# Patient Record
Sex: MALE | Race: WHITE | NOT HISPANIC OR LATINO | Employment: PART TIME | URBAN - METROPOLITAN AREA
[De-identification: names, ages, dates, MRNs, and addresses within clinical notes are randomized per-mention and may not be internally consistent; named-entity substitution may affect disease eponyms.]

---

## 2018-05-17 ENCOUNTER — OFFICE VISIT (OUTPATIENT)
Dept: CARDIOLOGY | Facility: CLINIC | Age: 63
End: 2018-05-17

## 2018-05-17 VITALS
SYSTOLIC BLOOD PRESSURE: 122 MMHG | WEIGHT: 260.8 LBS | HEART RATE: 96 BPM | HEIGHT: 70 IN | BODY MASS INDEX: 37.34 KG/M2 | DIASTOLIC BLOOD PRESSURE: 82 MMHG

## 2018-05-17 DIAGNOSIS — Z79.4 TYPE 2 DIABETES MELLITUS WITHOUT COMPLICATION, WITH LONG-TERM CURRENT USE OF INSULIN (HCC): ICD-10-CM

## 2018-05-17 DIAGNOSIS — R94.39 ABNORMAL STRESS ECHO: ICD-10-CM

## 2018-05-17 DIAGNOSIS — IMO0001 CLASS 2 OBESITY DUE TO EXCESS CALORIES WITH SERIOUS COMORBIDITY AND BODY MASS INDEX (BMI) OF 37.0 TO 37.9 IN ADULT: ICD-10-CM

## 2018-05-17 DIAGNOSIS — G47.33 OSA (OBSTRUCTIVE SLEEP APNEA): ICD-10-CM

## 2018-05-17 DIAGNOSIS — E78.5 HYPERLIPIDEMIA, UNSPECIFIED HYPERLIPIDEMIA TYPE: ICD-10-CM

## 2018-05-17 DIAGNOSIS — E11.9 TYPE 2 DIABETES MELLITUS WITHOUT COMPLICATION, WITH LONG-TERM CURRENT USE OF INSULIN (HCC): ICD-10-CM

## 2018-05-17 DIAGNOSIS — Z01.810 PREOP CARDIOVASCULAR EXAM: Primary | ICD-10-CM

## 2018-05-17 DIAGNOSIS — I10 ESSENTIAL HYPERTENSION: ICD-10-CM

## 2018-05-17 PROCEDURE — 99204 OFFICE O/P NEW MOD 45 MIN: CPT | Performed by: INTERNAL MEDICINE

## 2018-05-17 PROCEDURE — 93000 ELECTROCARDIOGRAM COMPLETE: CPT | Performed by: INTERNAL MEDICINE

## 2018-05-17 NOTE — PROGRESS NOTES
Date of Office Visit: 2018  Encounter Provider: Suzi Ambriz MD  Place of Service: [facility] CARDIOLOGY  Patient Name: Mark Manzo  :1955      Patient ID:  Mark Manzo is a 62 y.o. male is here for preoperative evaluation.           History of Present Illness    Mr. Manzo is preparing to have a right total knee replacement at [facility].  He has a known history of diabetes mellitus type 2, hyperlipidemia, obesity and hypertension.  His last hemoglobin A1c was 2017, 5.7.  He is on insulin and metformin.  He has observed sleep apnea and uses a CPAP machine.  He has osteoarthritis and needs a knee replacement.    He has history of alcohol dependence which is in remission, joint pain, fatigue.  He has a prior history of a left hip replacement.    He is  and lives alone and has 2 children.  He is a .  He has not had alcohol in 24 years.  He smoking 24 years ago.  He has 3 cups of coffee per day and 2 glasses of iced tea.    In his family, his father heart disease and strokes.  His mother and father both had hypertension.    He had a stress echocardiogram on 18 which was abnormal showing mild LVH, grade 1a diastolic dysfunction, LVEF at rest of 60% and abnormal stress study showing apical hypokinesis with exercise, consistent with ischemia.  He had no cp but did have dyspnea with exertion.     He is fairly sedentary.  He had lost 60 pounds but has gained it back because he's not been able to exercise due to knee pain.  He's been on insulin for greater than 10 years.  He doesn't feel his heart racing or skipping.  He's had no dizziness or syncope.  She denies chest pain or pressure but does have exertional dyspnea.  Has a history of prior myocardial infarction or heart failure.  There is no history of cancer, blood clots, renal disease, asthma, emphysema, strokes or aneurysms.    His surgery is planned with Dr. Mark Duran.        Past Medical  History:   Diagnosis Date   • Acute pain of right knee     H/O   • Body aches    • Chronic left hip pain    • Decreased energy    • Diabetes mellitus    • Fatigue    • History of allergic rhinitis    • History of left hip replacement    • Hyperlipidemia    • Hypertension    • Joint pain    • Sleep apnea    • Swelling    • Weight gain          Past Surgical History:   Procedure Laterality Date   • COLONOSCOPY     • OTHER SURGICAL HISTORY      Rectal Surgery Of Perirectal Fistula   • TONSILLECTOMY     • TOTAL HIP ARTHROPLASTY Left    • VASECTOMY         Current Outpatient Prescriptions on File Prior to Visit   Medication Sig Dispense Refill   • aspirin 81 MG chewable tablet Chew 81 mg Daily.     • cholestyramine (QUESTRAN) 4 g packet Take 1 packet by mouth Every 12 (Twelve) Hours.     • diclofenac sodium (VOTAREN XR) 100 MG 24 hr tablet Take 100 mg by mouth Daily.     • insulin NPH (humuLIN N,novoLIN N) 100 UNIT/ML injection Inject  under the skin 2 (Two) Times a Day Before Meals.     • lisinopril (PRINIVIL,ZESTRIL) 20 MG tablet Take 20 mg by mouth Daily.     • meloxicam (MOBIC) 7.5 MG tablet Take 7.5 mg by mouth Daily.     • metFORMIN (GLUCOPHAGE) 500 MG tablet Take 500 mg by mouth 2 (Two) Times a Day With Meals.       No current facility-administered medications on file prior to visit.        Social History     Social History   • Marital status:      Spouse name: N/A   • Number of children: N/A   • Years of education: N/A     Occupational History   • Not on file.     Social History Main Topics   • Smoking status: Former Smoker   • Smokeless tobacco: Former User   • Alcohol use Not on file      Comment: sober for 24 years    • Drug use: No      Comment: clean for 24 years    • Sexual activity: Not on file     Other Topics Concern   • Not on file     Social History Narrative   • No narrative on file           Review of Systems   Constitution: Negative.   HENT: Positive for hearing loss. Negative for  "congestion.    Eyes: Negative for vision loss in left eye and vision loss in right eye.   Cardiovascular: Positive for leg swelling.   Respiratory: Positive for snoring. Negative for cough, hemoptysis, shortness of breath, sleep disturbances due to breathing, sputum production and wheezing.    Endocrine: Negative.    Hematologic/Lymphatic: Negative.    Skin: Negative for poor wound healing and rash.   Musculoskeletal: Positive for joint pain. Negative for falls, gout, muscle cramps and myalgias.   Gastrointestinal: Negative for abdominal pain, diarrhea, dysphagia, hematemesis, melena, nausea and vomiting.   Neurological: Negative for excessive daytime sleepiness, dizziness, headaches, light-headedness, loss of balance, seizures and vertigo.   Psychiatric/Behavioral: Negative for depression and substance abuse. The patient is not nervous/anxious.        Procedures    ECG 12 Lead  Date/Time: 5/17/2018 8:26 AM  Performed by: OREN FIELDS  Authorized by: OREN FIELDS   Comparison: not compared with previous ECG   Previous ECG: no previous ECG available  Rhythm: sinus rhythm  Clinical impression: normal ECG                Objective:      Vitals:    05/17/18 0816 05/17/18 0821   BP: 130/82 122/82   BP Location: Right arm Left arm   Patient Position: Sitting Sitting   Pulse: 96    Weight: 118 kg (260 lb 12.8 oz)    Height: 177.8 cm (70\")      Body mass index is 37.42 kg/m².    Physical Exam   Constitutional: He is oriented to person, place, and time. He appears well-developed and well-nourished. No distress.   HENT:   Head: Normocephalic and atraumatic.   Eyes: Conjunctivae are normal. No scleral icterus.   Neck: Neck supple. No JVD present. Carotid bruit is not present. No thyromegaly present.   Cardiovascular: Normal rate, regular rhythm, S1 normal, S2 normal, normal heart sounds and intact distal pulses.   No extrasystoles are present. PMI is not displaced.  Exam reveals no gallop.    No murmur " heard.  Pulses:       Carotid pulses are 2+ on the right side, and 2+ on the left side.       Radial pulses are 2+ on the right side, and 2+ on the left side.        Dorsalis pedis pulses are 2+ on the right side, and 2+ on the left side.        Posterior tibial pulses are 2+ on the right side, and 2+ on the left side.   Pulmonary/Chest: Effort normal and breath sounds normal. No respiratory distress. He has no wheezes. He has no rhonchi. He has no rales. He exhibits no tenderness.   Abdominal: Soft. Bowel sounds are normal. He exhibits no distension, no abdominal bruit and no mass. There is no tenderness.   Musculoskeletal: He exhibits no edema or deformity.   Lymphadenopathy:     He has no cervical adenopathy.   Neurological: He is alert and oriented to person, place, and time. No cranial nerve deficit.   Skin: Skin is warm and dry. No rash noted. He is not diaphoretic. No cyanosis. No pallor. Nails show no clubbing.   Psychiatric: He has a normal mood and affect. Judgment normal.   Vitals reviewed.      Lab Review:       Assessment:      Diagnosis Plan   1. Preop cardiovascular exam  Case Request Cath Lab: Coronary angiography    Vascular Screening   2. Essential hypertension  Vascular Screening   3. Hyperlipidemia, unspecified hyperlipidemia type  Vascular Screening   4. Type 2 diabetes mellitus without complication, with long-term current use of insulin     5. VANDANA (obstructive sleep apnea)     6. Class 2 obesity due to excess calories with serious comorbidity and body mass index (BMI) of 37.0 to 37.9 in adult     7. Abnormal stress echo  Case Request Cath Lab: Coronary angiography     1. Preoperative evaluation for right knee surgery.  He is very sedentary.  The surgery has been delayed.  Dr. Duran's surgeon  2. hyperlipidemia.  He is on cholestyramine.  He's been intolerant with statin therapies in the past causing fatigue and muscle aching.  3. Diabetes mellitus type 2, on insulin for greater than 10 years.   Also on metformin.  4. Obesity and sedentary lifestyle  5. Hypertension, well controlled  6. Obstructive sleep apnea, on CPAP.     Plan:       Set up catheterization and vascular screening.  No medication changes.  Risks and benefits of the procedure d/w patient and he wants to proceed.     See Hanna in 1 year unless he requires coronary intervention.

## 2018-05-22 ENCOUNTER — TRANSCRIBE ORDERS (OUTPATIENT)
Dept: CARDIOLOGY | Facility: CLINIC | Age: 63
End: 2018-05-22

## 2018-05-22 ENCOUNTER — LAB (OUTPATIENT)
Dept: LAB | Facility: HOSPITAL | Age: 63
End: 2018-05-22
Attending: INTERNAL MEDICINE

## 2018-05-22 DIAGNOSIS — Z01.810 PRE-OPERATIVE CARDIOVASCULAR EXAMINATION: Primary | ICD-10-CM

## 2018-05-22 DIAGNOSIS — Z01.810 PRE-OPERATIVE CARDIOVASCULAR EXAMINATION: ICD-10-CM

## 2018-05-22 DIAGNOSIS — Z13.6 SCREENING FOR ISCHEMIC HEART DISEASE: ICD-10-CM

## 2018-05-22 LAB
ANION GAP SERPL CALCULATED.3IONS-SCNC: 11.8 MMOL/L
BASOPHILS # BLD AUTO: 0.02 10*3/MM3 (ref 0–0.2)
BASOPHILS NFR BLD AUTO: 0.4 % (ref 0–1.5)
BUN BLD-MCNC: 33 MG/DL (ref 8–23)
BUN/CREAT SERPL: 30.3 (ref 7–25)
CALCIUM SPEC-SCNC: 8.9 MG/DL (ref 8.6–10.5)
CHLORIDE SERPL-SCNC: 109 MMOL/L (ref 98–107)
CO2 SERPL-SCNC: 23.2 MMOL/L (ref 22–29)
CREAT BLD-MCNC: 1.09 MG/DL (ref 0.76–1.27)
DEPRECATED RDW RBC AUTO: 44.2 FL (ref 37–54)
EOSINOPHIL # BLD AUTO: 0.06 10*3/MM3 (ref 0–0.7)
EOSINOPHIL NFR BLD AUTO: 1.3 % (ref 0.3–6.2)
ERYTHROCYTE [DISTWIDTH] IN BLOOD BY AUTOMATED COUNT: 13.3 % (ref 11.5–14.5)
GFR SERPL CREATININE-BSD FRML MDRD: 68 ML/MIN/1.73
GLUCOSE BLD-MCNC: 220 MG/DL (ref 65–99)
HCT VFR BLD AUTO: 41.1 % (ref 40.4–52.2)
HGB BLD-MCNC: 13.4 G/DL (ref 13.7–17.6)
IMM GRANULOCYTES # BLD: 0.01 10*3/MM3 (ref 0–0.03)
IMM GRANULOCYTES NFR BLD: 0.2 % (ref 0–0.5)
LYMPHOCYTES # BLD AUTO: 1.41 10*3/MM3 (ref 0.9–4.8)
LYMPHOCYTES NFR BLD AUTO: 31.5 % (ref 19.6–45.3)
MCH RBC QN AUTO: 29.8 PG (ref 27–32.7)
MCHC RBC AUTO-ENTMCNC: 32.6 G/DL (ref 32.6–36.4)
MCV RBC AUTO: 91.3 FL (ref 79.8–96.2)
MONOCYTES # BLD AUTO: 0.36 10*3/MM3 (ref 0.2–1.2)
MONOCYTES NFR BLD AUTO: 8.1 % (ref 5–12)
NEUTROPHILS # BLD AUTO: 2.62 10*3/MM3 (ref 1.9–8.1)
NEUTROPHILS NFR BLD AUTO: 58.7 % (ref 42.7–76)
PLATELET # BLD AUTO: 230 10*3/MM3 (ref 140–500)
PMV BLD AUTO: 10.4 FL (ref 6–12)
POTASSIUM BLD-SCNC: 4.8 MMOL/L (ref 3.5–5.2)
RBC # BLD AUTO: 4.5 10*6/MM3 (ref 4.6–6)
SODIUM BLD-SCNC: 144 MMOL/L (ref 136–145)
WBC NRBC COR # BLD: 4.47 10*3/MM3 (ref 4.5–10.7)

## 2018-05-22 PROCEDURE — 85025 COMPLETE CBC W/AUTO DIFF WBC: CPT

## 2018-05-22 PROCEDURE — 36415 COLL VENOUS BLD VENIPUNCTURE: CPT

## 2018-05-22 PROCEDURE — 80048 BASIC METABOLIC PNL TOTAL CA: CPT

## 2018-05-23 ENCOUNTER — HOSPITAL ENCOUNTER (OUTPATIENT)
Facility: HOSPITAL | Age: 63
Setting detail: HOSPITAL OUTPATIENT SURGERY
Discharge: HOME | End: 2018-05-23
Attending: INTERNAL MEDICINE | Admitting: INTERNAL MEDICINE

## 2018-05-23 VITALS
HEIGHT: 70 IN | BODY MASS INDEX: 35.79 KG/M2 | SYSTOLIC BLOOD PRESSURE: 153 MMHG | TEMPERATURE: 97.5 F | OXYGEN SATURATION: 97 % | DIASTOLIC BLOOD PRESSURE: 83 MMHG | WEIGHT: 250 LBS | RESPIRATION RATE: 16 BRPM | HEART RATE: 74 BPM

## 2018-05-23 DIAGNOSIS — R94.39 ABNORMAL STRESS ECHO: ICD-10-CM

## 2018-05-23 DIAGNOSIS — Z01.810 PREOP CARDIOVASCULAR EXAM: ICD-10-CM

## 2018-05-23 LAB
GLUCOSE BLDC GLUCOMTR-MCNC: 123 MG/DL (ref 70–130)
GLUCOSE BLDC GLUCOMTR-MCNC: 124 MG/DL (ref 70–130)

## 2018-05-23 PROCEDURE — 99152 MOD SED SAME PHYS/QHP 5/>YRS: CPT | Performed by: INTERNAL MEDICINE

## 2018-05-23 PROCEDURE — C1894 INTRO/SHEATH, NON-LASER: HCPCS | Performed by: INTERNAL MEDICINE

## 2018-05-23 PROCEDURE — C1769 GUIDE WIRE: HCPCS | Performed by: INTERNAL MEDICINE

## 2018-05-23 PROCEDURE — 25010000002 FENTANYL CITRATE (PF) 100 MCG/2ML SOLUTION: Performed by: INTERNAL MEDICINE

## 2018-05-23 PROCEDURE — 93458 L HRT ARTERY/VENTRICLE ANGIO: CPT | Performed by: INTERNAL MEDICINE

## 2018-05-23 PROCEDURE — 99220 PR INITIAL OBSERVATION CARE/DAY 70 MINUTES: CPT | Performed by: NURSE PRACTITIONER

## 2018-05-23 PROCEDURE — 25010000002 MIDAZOLAM PER 1 MG: Performed by: INTERNAL MEDICINE

## 2018-05-23 PROCEDURE — 82962 GLUCOSE BLOOD TEST: CPT

## 2018-05-23 PROCEDURE — 0 IOPAMIDOL PER 1 ML: Performed by: INTERNAL MEDICINE

## 2018-05-23 RX ORDER — SODIUM CHLORIDE 9 MG/ML
75 INJECTION, SOLUTION INTRAVENOUS CONTINUOUS
Status: DISCONTINUED | OUTPATIENT
Start: 2018-05-23 | End: 2018-05-23 | Stop reason: HOSPADM

## 2018-05-23 RX ORDER — SODIUM CHLORIDE 0.9 % (FLUSH) 0.9 %
1-10 SYRINGE (ML) INJECTION AS NEEDED
Status: DISCONTINUED | OUTPATIENT
Start: 2018-05-23 | End: 2018-05-23 | Stop reason: HOSPADM

## 2018-05-23 RX ORDER — FENTANYL CITRATE 50 UG/ML
INJECTION, SOLUTION INTRAMUSCULAR; INTRAVENOUS AS NEEDED
Status: DISCONTINUED | OUTPATIENT
Start: 2018-05-23 | End: 2018-05-23 | Stop reason: HOSPADM

## 2018-05-23 RX ORDER — SODIUM CHLORIDE 9 MG/ML
100 INJECTION, SOLUTION INTRAVENOUS CONTINUOUS
Status: DISCONTINUED | OUTPATIENT
Start: 2018-05-23 | End: 2018-05-23 | Stop reason: HOSPADM

## 2018-05-23 RX ORDER — LIDOCAINE HYDROCHLORIDE 20 MG/ML
INJECTION, SOLUTION INFILTRATION; PERINEURAL AS NEEDED
Status: DISCONTINUED | OUTPATIENT
Start: 2018-05-23 | End: 2018-05-23 | Stop reason: HOSPADM

## 2018-05-23 RX ORDER — MIDAZOLAM HYDROCHLORIDE 1 MG/ML
INJECTION INTRAMUSCULAR; INTRAVENOUS AS NEEDED
Status: DISCONTINUED | OUTPATIENT
Start: 2018-05-23 | End: 2018-05-23 | Stop reason: HOSPADM

## 2018-05-23 RX ORDER — LIDOCAINE HYDROCHLORIDE 10 MG/ML
0.1 INJECTION, SOLUTION EPIDURAL; INFILTRATION; INTRACAUDAL; PERINEURAL ONCE AS NEEDED
Status: DISCONTINUED | OUTPATIENT
Start: 2018-05-23 | End: 2018-05-23 | Stop reason: HOSPADM

## 2018-05-23 RX ADMIN — SODIUM CHLORIDE 75 ML/HR: 9 INJECTION, SOLUTION INTRAVENOUS at 08:58

## 2018-05-23 RX ADMIN — SODIUM CHLORIDE 75 ML/HR: 9 INJECTION, SOLUTION INTRAVENOUS at 09:51

## 2018-05-23 NOTE — H&P (VIEW-ONLY)
Date of Office Visit: 2018  Encounter Provider: Suzi Ambriz MD  Place of Service: [facility] CARDIOLOGY  Patient Name: Mark Manzo  :1955      Patient ID:  Mark Manzo is a 62 y.o. male is here for preoperative evaluation.           History of Present Illness    Mr. Manzo is preparing to have a right total knee replacement at [facility].  He has a known history of diabetes mellitus type 2, hyperlipidemia, obesity and hypertension.  His last hemoglobin A1c was 2017, 5.7.  He is on insulin and metformin.  He has observed sleep apnea and uses a CPAP machine.  He has osteoarthritis and needs a knee replacement.    He has history of alcohol dependence which is in remission, joint pain, fatigue.  He has a prior history of a left hip replacement.    He is  and lives alone and has 2 children.  He is a .  He has not had alcohol in 24 years.  He smoking 24 years ago.  He has 3 cups of coffee per day and 2 glasses of iced tea.    In his family, his father heart disease and strokes.  His mother and father both had hypertension.    He had a stress echocardiogram on 18 which was abnormal showing mild LVH, grade 1a diastolic dysfunction, LVEF at rest of 60% and abnormal stress study showing apical hypokinesis with exercise, consistent with ischemia.  He had no cp but did have dyspnea with exertion.     He is fairly sedentary.  He had lost 60 pounds but has gained it back because he's not been able to exercise due to knee pain.  He's been on insulin for greater than 10 years.  He doesn't feel his heart racing or skipping.  He's had no dizziness or syncope.  She denies chest pain or pressure but does have exertional dyspnea.  Has a history of prior myocardial infarction or heart failure.  There is no history of cancer, blood clots, renal disease, asthma, emphysema, strokes or aneurysms.    His surgery is planned with Dr. Mark Duran.        Past Medical  History:   Diagnosis Date   • Acute pain of right knee     H/O   • Body aches    • Chronic left hip pain    • Decreased energy    • Diabetes mellitus    • Fatigue    • History of allergic rhinitis    • History of left hip replacement    • Hyperlipidemia    • Hypertension    • Joint pain    • Sleep apnea    • Swelling    • Weight gain          Past Surgical History:   Procedure Laterality Date   • COLONOSCOPY     • OTHER SURGICAL HISTORY      Rectal Surgery Of Perirectal Fistula   • TONSILLECTOMY     • TOTAL HIP ARTHROPLASTY Left    • VASECTOMY         Current Outpatient Prescriptions on File Prior to Visit   Medication Sig Dispense Refill   • aspirin 81 MG chewable tablet Chew 81 mg Daily.     • cholestyramine (QUESTRAN) 4 g packet Take 1 packet by mouth Every 12 (Twelve) Hours.     • diclofenac sodium (VOTAREN XR) 100 MG 24 hr tablet Take 100 mg by mouth Daily.     • insulin NPH (humuLIN N,novoLIN N) 100 UNIT/ML injection Inject  under the skin 2 (Two) Times a Day Before Meals.     • lisinopril (PRINIVIL,ZESTRIL) 20 MG tablet Take 20 mg by mouth Daily.     • meloxicam (MOBIC) 7.5 MG tablet Take 7.5 mg by mouth Daily.     • metFORMIN (GLUCOPHAGE) 500 MG tablet Take 500 mg by mouth 2 (Two) Times a Day With Meals.       No current facility-administered medications on file prior to visit.        Social History     Social History   • Marital status:      Spouse name: N/A   • Number of children: N/A   • Years of education: N/A     Occupational History   • Not on file.     Social History Main Topics   • Smoking status: Former Smoker   • Smokeless tobacco: Former User   • Alcohol use Not on file      Comment: sober for 24 years    • Drug use: No      Comment: clean for 24 years    • Sexual activity: Not on file     Other Topics Concern   • Not on file     Social History Narrative   • No narrative on file           Review of Systems   Constitution: Negative.   HENT: Positive for hearing loss. Negative for  "congestion.    Eyes: Negative for vision loss in left eye and vision loss in right eye.   Cardiovascular: Positive for leg swelling.   Respiratory: Positive for snoring. Negative for cough, hemoptysis, shortness of breath, sleep disturbances due to breathing, sputum production and wheezing.    Endocrine: Negative.    Hematologic/Lymphatic: Negative.    Skin: Negative for poor wound healing and rash.   Musculoskeletal: Positive for joint pain. Negative for falls, gout, muscle cramps and myalgias.   Gastrointestinal: Negative for abdominal pain, diarrhea, dysphagia, hematemesis, melena, nausea and vomiting.   Neurological: Negative for excessive daytime sleepiness, dizziness, headaches, light-headedness, loss of balance, seizures and vertigo.   Psychiatric/Behavioral: Negative for depression and substance abuse. The patient is not nervous/anxious.        Procedures    ECG 12 Lead  Date/Time: 5/17/2018 8:26 AM  Performed by: OREN FIELDS  Authorized by: OREN FIELDS   Comparison: not compared with previous ECG   Previous ECG: no previous ECG available  Rhythm: sinus rhythm  Clinical impression: normal ECG                Objective:      Vitals:    05/17/18 0816 05/17/18 0821   BP: 130/82 122/82   BP Location: Right arm Left arm   Patient Position: Sitting Sitting   Pulse: 96    Weight: 118 kg (260 lb 12.8 oz)    Height: 177.8 cm (70\")      Body mass index is 37.42 kg/m².    Physical Exam   Constitutional: He is oriented to person, place, and time. He appears well-developed and well-nourished. No distress.   HENT:   Head: Normocephalic and atraumatic.   Eyes: Conjunctivae are normal. No scleral icterus.   Neck: Neck supple. No JVD present. Carotid bruit is not present. No thyromegaly present.   Cardiovascular: Normal rate, regular rhythm, S1 normal, S2 normal, normal heart sounds and intact distal pulses.   No extrasystoles are present. PMI is not displaced.  Exam reveals no gallop.    No murmur " heard.  Pulses:       Carotid pulses are 2+ on the right side, and 2+ on the left side.       Radial pulses are 2+ on the right side, and 2+ on the left side.        Dorsalis pedis pulses are 2+ on the right side, and 2+ on the left side.        Posterior tibial pulses are 2+ on the right side, and 2+ on the left side.   Pulmonary/Chest: Effort normal and breath sounds normal. No respiratory distress. He has no wheezes. He has no rhonchi. He has no rales. He exhibits no tenderness.   Abdominal: Soft. Bowel sounds are normal. He exhibits no distension, no abdominal bruit and no mass. There is no tenderness.   Musculoskeletal: He exhibits no edema or deformity.   Lymphadenopathy:     He has no cervical adenopathy.   Neurological: He is alert and oriented to person, place, and time. No cranial nerve deficit.   Skin: Skin is warm and dry. No rash noted. He is not diaphoretic. No cyanosis. No pallor. Nails show no clubbing.   Psychiatric: He has a normal mood and affect. Judgment normal.   Vitals reviewed.      Lab Review:       Assessment:      Diagnosis Plan   1. Preop cardiovascular exam  Case Request Cath Lab: Coronary angiography    Vascular Screening   2. Essential hypertension  Vascular Screening   3. Hyperlipidemia, unspecified hyperlipidemia type  Vascular Screening   4. Type 2 diabetes mellitus without complication, with long-term current use of insulin     5. VANDANA (obstructive sleep apnea)     6. Class 2 obesity due to excess calories with serious comorbidity and body mass index (BMI) of 37.0 to 37.9 in adult     7. Abnormal stress echo  Case Request Cath Lab: Coronary angiography     1. Preoperative evaluation for right knee surgery.  He is very sedentary.  The surgery has been delayed.  Dr. Duran's surgeon  2. hyperlipidemia.  He is on cholestyramine.  He's been intolerant with statin therapies in the past causing fatigue and muscle aching.  3. Diabetes mellitus type 2, on insulin for greater than 10 years.   Also on metformin.  4. Obesity and sedentary lifestyle  5. Hypertension, well controlled  6. Obstructive sleep apnea, on CPAP.     Plan:       Set up catheterization and vascular screening.  No medication changes.  Risks and benefits of the procedure d/w patient and he wants to proceed.     See Hanna in 1 year unless he requires coronary intervention.

## 2018-05-23 NOTE — PERIOPERATIVE NURSING NOTE
Call to KATHIA Cruz with Dr. Ortega yo make sure they were aware of consult. She said that they are aware and Dr. Ortega wants to see the patient before he goes home today.

## 2018-05-23 NOTE — INTERVAL H&P NOTE
Multiple CRF with knee surg scheduled and abnl strews.  H&P reviewed. The patient was examined and there are no changes to the H&P. I have explained the risks and benefits of the procedure to the patient.  The patient understands and agrees to proceed

## 2018-05-23 NOTE — CONSULTS
Patient Care Team:  Malia Meza MD as PCP - General (Family Medicine)    Chief complaint:  CAD    Subjective     History of Present Illness:  Mr. Manzo is a 62-year-old male who we were asked to see by Dr. Sarmiento status post cardiac catheter.  The patient was seen in Dr. Ambriz's office after an abnormal EKG was obtained in preop evaluation for a right total knee replacement.  The patient had an abnormal stress test that ultimately led to a cardiac catheter today.  The patient states that he has had no symptomology, he denies chest pain, shortness of breath, fatigue, orthopnea, edema, or syncope.  His cardiac catheter demonstrated significant multivessel coronary disease and we have been consulted for possible surgical intervention.    Primary medical history:  Diabetes type 2-on insulin and oral hypoglycemic, hypertension, hyperlipidemia, VANDANA-CPAP compliant, osteoarthritis-chronic NSAID use    Surgical history:  Left hip replacement, vasectomy, perirectal fistula repair, remote tonsillectomy    Social history: Is , lives alone, works part-time as a  for disabled/developmentally delayed adults.  Quit smoking marijuana and drinking alcohol approximately 24 years ago, denies current illicit use, denies herbal use.  States he is fairly active although limited by his knee issues.    Family history: Father with coronary artery disease/ischemic cardiomyopathy and CVA    Review of Systems   Constitutional: Negative for activity change, diaphoresis, fatigue and fever.   HENT: Negative for dental problem and mouth sores.    Respiratory: Positive for apnea. Negative for cough, chest tightness, shortness of breath and wheezing.    Cardiovascular: Negative for chest pain, palpitations and leg swelling.   Gastrointestinal: Negative for abdominal distention, abdominal pain, blood in stool, diarrhea, nausea and vomiting.   Genitourinary: Negative for difficulty urinating, flank pain, frequency, hematuria  "and urgency.   Musculoskeletal: Positive for arthralgias and myalgias. Negative for gait problem.   Skin: Negative for pallor, rash and wound.   Allergic/Immunologic: Negative for environmental allergies and immunocompromised state.   Neurological: Negative for dizziness, seizures, syncope, weakness, light-headedness and numbness.   Hematological: Negative for adenopathy. Does not bruise/bleed easily.   Psychiatric/Behavioral: Negative.         Past Medical History:   Diagnosis Date   • Acute pain of right knee     H/O   • Body aches    • Chronic left hip pain    • Decreased energy    • Diabetes mellitus    • Fatigue    • History of allergic rhinitis    • History of left hip replacement    • Hyperlipidemia    • Hypertension    • Joint pain    • Sleep apnea    • Swelling    • Weight gain      Past Surgical History:   Procedure Laterality Date   • COLONOSCOPY     • OTHER SURGICAL HISTORY      Rectal Surgery Of Perirectal Fistula   • TONSILLECTOMY     • TOTAL HIP ARTHROPLASTY Left    • VASECTOMY       Family History   Problem Relation Age of Onset   • Hypertension Mother    • Heart attack Father    • Stroke Father    • Hypertension Father    • Diabetes Sister    • Stroke Paternal Grandfather    • Diabetes Sister      Social History   Substance Use Topics   • Smoking status: Former Smoker   • Smokeless tobacco: Former User   • Alcohol use Not on file      Comment: sober for 24 years      No prescriptions prior to admission.       Allergies:  Patient has no known allergies.    Objective      Vital Signs  Temp:  [97.5 °F (36.4 °C)] 97.5 °F (36.4 °C)  Heart Rate:  [72-80] 74  Resp:  [16-18] 16  BP: (120-165)/(75-96) 153/83    Flowsheet Rows      First Filed Value   Admission Height  177.8 cm (70\") Documented at 05/23/2018 0856   Admission Weight  113 kg (250 lb) Documented at 05/23/2018 0856        177.8 cm (70\")    Physical Exam   Constitutional: He is oriented to person, place, and time. Vital signs are normal. He " appears well-developed and well-nourished. He is cooperative.   Eyes: No scleral icterus.   Neck: Neck supple. Normal carotid pulses and no JVD present. Carotid bruit is not present. No thyroid mass and no thyromegaly present.   Cardiovascular: Normal rate, regular rhythm, S1 normal, S2 normal, normal heart sounds and intact distal pulses.  Exam reveals no gallop and no friction rub.    No murmur heard.  Pulses:       Carotid pulses are 2+ on the right side, and 2+ on the left side.       Radial pulses are 2+ on the right side, and 2+ on the left side.        Dorsalis pedis pulses are 2+ on the right side, and 2+ on the left side.        Posterior tibial pulses are 2+ on the right side, and 2+ on the left side.   Pulmonary/Chest: Effort normal and breath sounds normal. He has no wheezes. He has no rales.   Abdominal: Soft. Bowel sounds are normal. He exhibits no distension, no abdominal bruit and no mass. There is no hepatosplenomegaly. There is no tenderness. There is no guarding and no CVA tenderness.   Musculoskeletal: He exhibits no edema, tenderness or deformity.     Skin Integrity  -  His right foot skin is intact.His left foot skin is intact..  Lymphadenopathy:        Head (right side): No submental, no submandibular, no preauricular, no posterior auricular and no occipital adenopathy present.        Head (left side): No submental, no submandibular, no preauricular, no posterior auricular and no occipital adenopathy present.     He has no cervical adenopathy.        Right: No supraclavicular adenopathy present.        Left: No supraclavicular adenopathy present.   Neurological: He is alert and oriented to person, place, and time.   Skin: Skin is warm, dry and intact. Capillary refill takes less than 2 seconds. No rash noted. No erythema. No pallor.   Psychiatric: He has a normal mood and affect. His speech is normal and behavior is normal. Judgment and thought content normal.       Results Review:   Lab  Results (last 24 hours)     Procedure Component Value Units Date/Time    POC Glucose Once [[id number]]  (Normal) Collected:  05/23/18 1034    Specimen:  Blood Updated:  05/23/18 1039     Glucose 123 mg/dL     Narrative:       Meter: [id number] : [id number] Mo Laureano RN    POC Glucose Once [[id number]]  (Normal) Collected:  05/23/18 0854    Specimen:  Blood Updated:  05/23/18 0856     Glucose 124 mg/dL     Narrative:       NO TX Meter: [id number] : [id number] Casie Jose RN              Assessment/Plan     Active Problems:    Preop cardiovascular exam    Abnormal stress echo      Assessment & Plan    Multivessel CAD  HTN---controlled on current tx  HL----on statin  DM II----insulin and oral hypoglycemics  VANDANA----CPAP compliant  Osteoarthritis----preop eval for right total knee, chronic NSAID use    Dr. Ortega has reviewed his cath films and recommends CABG.  Discussed at length with patient and family surgical risks and expected recovery- patient wishes to proceed.  Our office will call the patient with surgery date and PAT appointment tomorrow.  Vein harvest from left leg if feasible with upcoming joint surgery.      Thank you for allowing us to participate in the care of this patient.      KATHIA Angulo  05/23/18  6:16 PM

## 2018-05-23 NOTE — DISCHARGE INSTRUCTIONS
[facility]  [address] [place] [address]    Coronary Angiogram (Radial/Ulnar Approach) After Care    Refer to this sheet in the next few weeks. These instructions provide you with information on caring for yourself after your procedure. Your caregiver may also give you more specific instructions. Your treatment has been planned according to current medical practices, but problems sometimes occur. Call your caregiver if you have any problems or questions after your procedure.    Home Care Instructions:  · You may shower the day after the procedure. Remove the bandage (dressing) and gently wash the site with plain soap and water. Gently pat the site dry. You may apply a band aid daily for 2 days if desired.    · Do not apply powder or lotion to the site.  · Do not submerge the affected site in water for 3 to 5 days or until the site is completely healed.   · Do not lift, push or pull anything over 10 pounds for 2 days after your procedure.  · Inspect the site at least twice daily. You may notice some bruising at the site and it may be tender for 1 to 2 weeks.     · Increase your fluid intake for the next 2 days.    · Keep arm elevated for 24 hours. For the remainder of the day, keep your arm in “Pledge of Allegiance” position when up and about.     · You may drive 24 hours after the procedure unless otherwise instructed by your caregiver.  · Do not operate machinery or power tools for 24 hours.  · A responsible adult should be with you for the first 24 hours after you arrive home. Do not make any important legal decisions or sign legal papers for 24 hours.  Do not drink alcohol for 24 hours.    · Metformin or any medications containing Metformin should not be taken for 48 hours after your procedure.      Call Your Doctor if:   · You have unusual pain at the radial/ulnar (wrist) site.  · You have redness, warmth, swelling, or pain at the radial/ulnar (wrist) site.  · You have drainage (other  than a small amount of blood on the dressing).  · You have chills or a fever > 101.  · Your arm becomes pale or dark, cool, tingly, or numb.  · You have heavy bleeding from the site, hold pressure on the site for 20 minutes.  If the bleeding stops, apply a fresh bandage and call your cardiologist.  However, if you continue to have bleeding, call 911.

## 2018-05-24 ENCOUNTER — TELEPHONE (OUTPATIENT)
Dept: CARDIAC SURGERY | Facility: CLINIC | Age: 63
End: 2018-05-24

## 2018-05-24 ENCOUNTER — PREP FOR SURGERY (OUTPATIENT)
Dept: OTHER | Facility: HOSPITAL | Age: 63
End: 2018-05-24

## 2018-05-24 DIAGNOSIS — I25.10 ASHD (ARTERIOSCLEROTIC HEART DISEASE): Primary | ICD-10-CM

## 2018-05-24 DIAGNOSIS — E11.8 TYPE 2 DIABETES MELLITUS WITH COMPLICATION, UNSPECIFIED LONG TERM INSULIN USE STATUS: ICD-10-CM

## 2018-05-24 DIAGNOSIS — R79.1 ABNORMAL COAGULATION PROFILE: ICD-10-CM

## 2018-05-24 DIAGNOSIS — I25.10 CORONARY ARTERY DISEASE INVOLVING NATIVE HEART WITHOUT ANGINA PECTORIS, UNSPECIFIED VESSEL OR LESION TYPE: ICD-10-CM

## 2018-05-24 DIAGNOSIS — I65.23 OCCLUSION AND STENOSIS OF BILATERAL CAROTID ARTERIES: ICD-10-CM

## 2018-05-24 RX ORDER — CHLORHEXIDINE GLUCONATE 0.12 MG/ML
15 RINSE ORAL EVERY 12 HOURS
Status: CANCELLED | OUTPATIENT
Start: 2018-05-24 | End: 2018-05-25

## 2018-05-24 RX ORDER — CHLORHEXIDINE GLUCONATE 0.12 MG/ML
15 RINSE ORAL ONCE
Status: CANCELLED | OUTPATIENT
Start: 2018-05-24 | End: 2018-05-24

## 2018-05-24 RX ORDER — CHLORHEXIDINE GLUCONATE 500 MG/1
1 CLOTH TOPICAL EVERY 12 HOURS PRN
Status: CANCELLED | OUTPATIENT
Start: 2018-05-24

## 2018-05-24 RX ORDER — CEFAZOLIN SODIUM 2 G/100ML
2 INJECTION, SOLUTION INTRAVENOUS ONCE
Status: CANCELLED | OUTPATIENT
Start: 2018-05-24 | End: 2018-05-24

## 2018-05-25 ENCOUNTER — HOSPITAL ENCOUNTER (OUTPATIENT)
Dept: GENERAL RADIOLOGY | Facility: HOSPITAL | Age: 63
Discharge: HOME | End: 2018-05-25

## 2018-05-25 ENCOUNTER — ANESTHESIA EVENT (OUTPATIENT)
Dept: PERIOP | Facility: HOSPITAL | Age: 63
End: 2018-05-25

## 2018-05-25 ENCOUNTER — HOSPITAL ENCOUNTER (OUTPATIENT)
Dept: CARDIOLOGY | Facility: HOSPITAL | Age: 63
Discharge: HOME | End: 2018-05-25
Admitting: NURSE PRACTITIONER

## 2018-05-25 ENCOUNTER — HOSPITAL ENCOUNTER (OUTPATIENT)
Dept: CARDIOLOGY | Facility: HOSPITAL | Age: 63
Discharge: HOME | End: 2018-05-25

## 2018-05-25 ENCOUNTER — APPOINTMENT (OUTPATIENT)
Dept: PREADMISSION TESTING | Facility: HOSPITAL | Age: 63
End: 2018-05-25

## 2018-05-25 VITALS
OXYGEN SATURATION: 96 % | SYSTOLIC BLOOD PRESSURE: 130 MMHG | HEIGHT: 70 IN | WEIGHT: 254 LBS | DIASTOLIC BLOOD PRESSURE: 80 MMHG | TEMPERATURE: 97.1 F | BODY MASS INDEX: 36.36 KG/M2 | RESPIRATION RATE: 18 BRPM | HEART RATE: 82 BPM

## 2018-05-25 DIAGNOSIS — I65.23 OCCLUSION AND STENOSIS OF BILATERAL CAROTID ARTERIES: ICD-10-CM

## 2018-05-25 DIAGNOSIS — E11.8 TYPE 2 DIABETES MELLITUS WITH COMPLICATION, UNSPECIFIED LONG TERM INSULIN USE STATUS: ICD-10-CM

## 2018-05-25 DIAGNOSIS — I25.10 ASHD (ARTERIOSCLEROTIC HEART DISEASE): ICD-10-CM

## 2018-05-25 DIAGNOSIS — R79.1 ABNORMAL COAGULATION PROFILE: ICD-10-CM

## 2018-05-25 DIAGNOSIS — I25.10 CORONARY ARTERY DISEASE INVOLVING NATIVE HEART WITHOUT ANGINA PECTORIS, UNSPECIFIED VESSEL OR LESION TYPE: ICD-10-CM

## 2018-05-25 LAB
ABO GROUP BLD: NORMAL
ALBUMIN SERPL-MCNC: 4.4 G/DL (ref 3.5–5.2)
ALBUMIN/GLOB SERPL: 1.4 G/DL
ALP SERPL-CCNC: 55 U/L (ref 39–117)
ALT SERPL W P-5'-P-CCNC: 22 U/L (ref 1–41)
ANION GAP SERPL CALCULATED.3IONS-SCNC: 11.6 MMOL/L
APTT PPP: 28.7 SECONDS (ref 22.7–35.4)
ARTERIAL PATENCY WRIST A: POSITIVE
AST SERPL-CCNC: 19 U/L (ref 1–40)
ATMOSPHERIC PRESS: 751.8 MMHG
BASE EXCESS BLDA CALC-SCNC: -2.7 MMOL/L (ref 0–2)
BASOPHILS # BLD AUTO: 0.02 10*3/MM3 (ref 0–0.2)
BASOPHILS NFR BLD AUTO: 0.4 % (ref 0–1.5)
BDY SITE: ABNORMAL
BH CV XLRA MEAS - DIST GSV CALF DIST LEFT: 0.18 CM
BH CV XLRA MEAS - DIST GSV CALF DIST RIGHT: 0.21 CM
BH CV XLRA MEAS - DIST GSV THIGH DIST LEFT: 0.17 CM
BH CV XLRA MEAS - DIST GSV THIGH DIST RIGHT: 0.14 CM
BH CV XLRA MEAS - DIST LSV CALF DIST LEFT: 0.14 CM
BH CV XLRA MEAS - GSV ANKLE DIST LEFT: 0.16 CM
BH CV XLRA MEAS - GSV ANKLE DIST RIGHT: 0.4 CM
BH CV XLRA MEAS - GSV KNEE DIST LEFT: 0.16 CM
BH CV XLRA MEAS - GSV KNEE DIST RIGHT: 0.15 CM
BH CV XLRA MEAS - GSV ORIGIN DIST LEFT: 0.28 CM
BH CV XLRA MEAS - GSV ORIGIN DIST RIGHT: 0.27 CM
BH CV XLRA MEAS - MID GSV CALF LEFT: 0.18 CM
BH CV XLRA MEAS - MID GSV CALF RIGHT: 0.16 CM
BH CV XLRA MEAS - MID GSV THIGH  LEFT: 0.14 CM
BH CV XLRA MEAS - MID GSV THIGH  RIGHT: 0.15 CM
BH CV XLRA MEAS - MID LSV CALF DIST LEFT: 0.16 CM
BH CV XLRA MEAS - MID LSV CALF DIST RIGHT: 0.11 CM
BH CV XLRA MEAS - PROX GSV CALF DIST LEFT: 0.14 CM
BH CV XLRA MEAS - PROX GSV CALF DIST RIGHT: 0.12 CM
BH CV XLRA MEAS - PROX GSV THIGH  LEFT: 0.2 CM
BH CV XLRA MEAS - PROX GSV THIGH  RIGHT: 0.35 CM
BH CV XLRA MEAS - PROX LSV CALF DIST LEFT: 0.28 CM
BH CV XLRA MEAS LEFT DIST CCA EDV: -21.2 CM/SEC
BH CV XLRA MEAS LEFT DIST CCA PSV: -84.1 CM/SEC
BH CV XLRA MEAS LEFT DIST ICA EDV: -24.6 CM/SEC
BH CV XLRA MEAS LEFT DIST ICA PSV: -87.4 CM/SEC
BH CV XLRA MEAS LEFT MID ICA EDV: -18.2 CM/SEC
BH CV XLRA MEAS LEFT MID ICA PSV: -64.5 CM/SEC
BH CV XLRA MEAS LEFT PROX CCA EDV: 21.2 CM/SEC
BH CV XLRA MEAS LEFT PROX CCA PSV: 99 CM/SEC
BH CV XLRA MEAS LEFT PROX ECA EDV: -14.9 CM/SEC
BH CV XLRA MEAS LEFT PROX ECA PSV: -95.1 CM/SEC
BH CV XLRA MEAS LEFT PROX ICA EDV: -24.6 CM/SEC
BH CV XLRA MEAS LEFT PROX ICA PSV: -56.3 CM/SEC
BH CV XLRA MEAS LEFT PROX SCLA PSV: 110 CM/SEC
BH CV XLRA MEAS LEFT VERTEBRAL A EDV: 12.1 CM/SEC
BH CV XLRA MEAS LEFT VERTEBRAL A PSV: 50.5 CM/SEC
BH CV XLRA MEAS RIGHT DIST CCA EDV: 18.8 CM/SEC
BH CV XLRA MEAS RIGHT DIST CCA PSV: 69.2 CM/SEC
BH CV XLRA MEAS RIGHT DIST ICA EDV: 35.2 CM/SEC
BH CV XLRA MEAS RIGHT DIST ICA PSV: 85 CM/SEC
BH CV XLRA MEAS RIGHT MID ICA EDV: -42.2 CM/SEC
BH CV XLRA MEAS RIGHT MID ICA PSV: -92.6 CM/SEC
BH CV XLRA MEAS RIGHT PROX CCA EDV: -17.6 CM/SEC
BH CV XLRA MEAS RIGHT PROX CCA PSV: -69.2 CM/SEC
BH CV XLRA MEAS RIGHT PROX ECA EDV: -10 CM/SEC
BH CV XLRA MEAS RIGHT PROX ECA PSV: -97.9 CM/SEC
BH CV XLRA MEAS RIGHT PROX ICA EDV: -24 CM/SEC
BH CV XLRA MEAS RIGHT PROX ICA PSV: -67.4 CM/SEC
BH CV XLRA MEAS RIGHT PROX SCLA PSV: -150 CM/SEC
BH CV XLRA MEAS RIGHT VERTEBRAL A EDV: 10.2 CM/SEC
BH CV XLRA MEAS RIGHT VERTEBRAL A PSV: 29.5 CM/SEC
BILIRUB SERPL-MCNC: 0.9 MG/DL (ref 0.1–1.2)
BILIRUB UR QL STRIP: NEGATIVE
BLD GP AB SCN SERPL QL: NEGATIVE
BUN BLD-MCNC: 17 MG/DL (ref 8–23)
BUN/CREAT SERPL: 16.7 (ref 7–25)
CALCIUM SPEC-SCNC: 9.6 MG/DL (ref 8.6–10.5)
CHLORIDE SERPL-SCNC: 103 MMOL/L (ref 98–107)
CHOLEST SERPL-MCNC: 266 MG/DL (ref 0–200)
CLARITY UR: CLEAR
CLOSE TME COLL+ADP + EPINEP PNL BLD: 97 % (ref 86–100)
CO2 SERPL-SCNC: 25.4 MMOL/L (ref 22–29)
COLOR UR: YELLOW
CREAT BLD-MCNC: 1.02 MG/DL (ref 0.76–1.27)
DEPRECATED RDW RBC AUTO: 42.7 FL (ref 37–54)
EOSINOPHIL # BLD AUTO: 0.1 10*3/MM3 (ref 0–0.7)
EOSINOPHIL NFR BLD AUTO: 1.8 % (ref 0.3–6.2)
ERYTHROCYTE [DISTWIDTH] IN BLOOD BY AUTOMATED COUNT: 13.1 % (ref 11.5–14.5)
GFR SERPL CREATININE-BSD FRML MDRD: 74 ML/MIN/1.73
GLOBULIN UR ELPH-MCNC: 3.1 GM/DL
GLUCOSE BLD-MCNC: 104 MG/DL (ref 65–99)
GLUCOSE UR STRIP-MCNC: NEGATIVE MG/DL
HBA1C MFR BLD: 6 % (ref 4.8–5.6)
HCO3 BLDA-SCNC: 22.2 MMOL/L (ref 22–28)
HCT VFR BLD AUTO: 43.5 % (ref 40.4–52.2)
HDLC SERPL-MCNC: 49 MG/DL (ref 40–60)
HGB BLD-MCNC: 14.4 G/DL (ref 13.7–17.6)
HGB UR QL STRIP.AUTO: NEGATIVE
IMM GRANULOCYTES # BLD: 0.02 10*3/MM3 (ref 0–0.03)
IMM GRANULOCYTES NFR BLD: 0.4 % (ref 0–0.5)
INR PPP: 1.06 (ref 0.9–1.1)
KETONES UR QL STRIP: NEGATIVE
LDLC SERPL CALC-MCNC: 190 MG/DL (ref 0–100)
LDLC/HDLC SERPL: 3.88 {RATIO}
LEFT ARM BP: NORMAL MMHG
LEUKOCYTE ESTERASE UR QL STRIP.AUTO: NEGATIVE
LYMPHOCYTES # BLD AUTO: 1.83 10*3/MM3 (ref 0.9–4.8)
LYMPHOCYTES NFR BLD AUTO: 33 % (ref 19.6–45.3)
MCH RBC QN AUTO: 29.6 PG (ref 27–32.7)
MCHC RBC AUTO-ENTMCNC: 33.1 G/DL (ref 32.6–36.4)
MCV RBC AUTO: 89.3 FL (ref 79.8–96.2)
MODALITY: ABNORMAL
MONOCYTES # BLD AUTO: 0.52 10*3/MM3 (ref 0.2–1.2)
MONOCYTES NFR BLD AUTO: 9.4 % (ref 5–12)
NEUTROPHILS # BLD AUTO: 3.08 10*3/MM3 (ref 1.9–8.1)
NEUTROPHILS NFR BLD AUTO: 55.4 % (ref 42.7–76)
NITRITE UR QL STRIP: NEGATIVE
NT-PROBNP SERPL-MCNC: 38.1 PG/ML (ref 5–900)
PCO2 BLDA: 38.1 MM HG (ref 35–45)
PH BLDA: 7.37 PH UNITS (ref 7.35–7.45)
PH UR STRIP.AUTO: <=5 [PH] (ref 5–8)
PLATELET # BLD AUTO: 216 10*3/MM3 (ref 140–500)
PMV BLD AUTO: 9.8 FL (ref 6–12)
PO2 BLDA: 89.2 MM HG (ref 80–100)
POTASSIUM BLD-SCNC: 4.7 MMOL/L (ref 3.5–5.2)
PROT SERPL-MCNC: 7.5 G/DL (ref 6–8.5)
PROT UR QL STRIP: NEGATIVE
PROTHROMBIN TIME: 13.6 SECONDS (ref 11.7–14.2)
RBC # BLD AUTO: 4.87 10*6/MM3 (ref 4.6–6)
RH BLD: NEGATIVE
RIGHT ARM BP: NORMAL MMHG
SAO2 % BLDCOA: 96.7 % (ref 92–99)
SODIUM BLD-SCNC: 140 MMOL/L (ref 136–145)
SP GR UR STRIP: 1.01 (ref 1–1.03)
T&S EXPIRATION DATE: NORMAL
TOTAL RATE: 16 BREATHS/MINUTE
TRIGL SERPL-MCNC: 134 MG/DL (ref 0–150)
UROBILINOGEN UR QL STRIP: NORMAL
VLDLC SERPL-MCNC: 26.8 MG/DL (ref 5–40)
WBC NRBC COR # BLD: 5.55 10*3/MM3 (ref 4.5–10.7)

## 2018-05-25 PROCEDURE — 83036 HEMOGLOBIN GLYCOSYLATED A1C: CPT | Performed by: NURSE PRACTITIONER

## 2018-05-25 PROCEDURE — 86901 BLOOD TYPING SEROLOGIC RH(D): CPT | Performed by: NURSE PRACTITIONER

## 2018-05-25 PROCEDURE — 93880 EXTRACRANIAL BILAT STUDY: CPT

## 2018-05-25 PROCEDURE — 81003 URINALYSIS AUTO W/O SCOPE: CPT | Performed by: NURSE PRACTITIONER

## 2018-05-25 PROCEDURE — 85730 THROMBOPLASTIN TIME PARTIAL: CPT | Performed by: NURSE PRACTITIONER

## 2018-05-25 PROCEDURE — 86920 COMPATIBILITY TEST SPIN: CPT

## 2018-05-25 PROCEDURE — 80053 COMPREHEN METABOLIC PANEL: CPT | Performed by: NURSE PRACTITIONER

## 2018-05-25 PROCEDURE — 85025 COMPLETE CBC W/AUTO DIFF WBC: CPT | Performed by: NURSE PRACTITIONER

## 2018-05-25 PROCEDURE — 83880 ASSAY OF NATRIURETIC PEPTIDE: CPT | Performed by: NURSE PRACTITIONER

## 2018-05-25 PROCEDURE — 85610 PROTHROMBIN TIME: CPT | Performed by: NURSE PRACTITIONER

## 2018-05-25 PROCEDURE — 93970 EXTREMITY STUDY: CPT

## 2018-05-25 PROCEDURE — 86850 RBC ANTIBODY SCREEN: CPT | Performed by: NURSE PRACTITIONER

## 2018-05-25 PROCEDURE — 93010 ELECTROCARDIOGRAM REPORT: CPT | Performed by: INTERNAL MEDICINE

## 2018-05-25 PROCEDURE — 36600 WITHDRAWAL OF ARTERIAL BLOOD: CPT | Performed by: FAMILY MEDICINE

## 2018-05-25 PROCEDURE — 71046 X-RAY EXAM CHEST 2 VIEWS: CPT

## 2018-05-25 PROCEDURE — 86900 BLOOD TYPING SEROLOGIC ABO: CPT | Performed by: NURSE PRACTITIONER

## 2018-05-25 PROCEDURE — 80061 LIPID PANEL: CPT | Performed by: NURSE PRACTITIONER

## 2018-05-25 PROCEDURE — S0260 H&P FOR SURGERY: HCPCS | Performed by: NURSE PRACTITIONER

## 2018-05-25 PROCEDURE — 93005 ELECTROCARDIOGRAM TRACING: CPT

## 2018-05-25 PROCEDURE — 85576 BLOOD PLATELET AGGREGATION: CPT | Performed by: NURSE PRACTITIONER

## 2018-05-25 PROCEDURE — 36415 COLL VENOUS BLD VENIPUNCTURE: CPT

## 2018-05-25 PROCEDURE — 82803 BLOOD GASES ANY COMBINATION: CPT | Performed by: FAMILY MEDICINE

## 2018-05-25 RX ORDER — CHLORHEXIDINE GLUCONATE 0.12 MG/ML
15 RINSE ORAL EVERY 12 HOURS
Status: DISPENSED | OUTPATIENT
Start: 2018-05-25 | End: 2018-05-26

## 2018-05-25 NOTE — ANESTHESIA PREPROCEDURE EVALUATION
Anesthesia Evaluation     Patient summary reviewed and Nursing notes reviewed   no history of anesthetic complications:               Airway   Mallampati: III  TM distance: <3 FB  Neck ROM: full  Possible difficult intubation and Large neck circumference  Dental    (+) poor dentition    Pulmonary - normal exam    breath sounds clear to auscultation  (+) a smoker Former, sleep apnea on CPAP,   Cardiovascular - normal exam    ECG reviewed  Rhythm: regular  Rate: normal    (+) hypertension, CAD, hyperlipidemia,   (-) angina, CANTU    ROS comment: CAD discovered during preop eval for knee surgery.   Cath results:   CORONARY ANGIOGRAPHY:  Left main: Normal  Left anterior descending: Long 90% proximal and mid LAD disease some diffuse 20% disease in the distal LAD the first diagonal has a 90% lesion in it the second diagonal has a 90% lesion in it  Ramus intermedius:Not present  Circumflex: Left dominant vessel OM1 with a 90% proximal lesion and it 30% distal disease PDA is free of obstructive disease  RCA: 100% occluded in the midportion     SUMMARY: He has severe three-vessel coronary disease not amenable to PCI     Neuro/Psych  (+) psychiatric history Depression,     GI/Hepatic/Renal/Endo    (+) obesity, morbid obesity,  diabetes mellitus type 2 using insulin,     Musculoskeletal     Abdominal   (+) obese,    Substance History      OB/GYN          Other   (+) arthritis                   Anesthesia Plan    ASA 4     general   (Discussed risks/benefits/alternatives of anesthesia with patient and wife. They agree to proceed as planned. Art line/SGC/SAGE)  intravenous induction   Anesthetic plan and risks discussed with patient and spouse/significant other.

## 2018-05-25 NOTE — DISCHARGE INSTRUCTIONS

## 2018-05-29 ENCOUNTER — HOSPITAL ENCOUNTER (INPATIENT)
Facility: HOSPITAL | Age: 63
LOS: 4 days | Discharge: HOME HEALTH | End: 2018-06-02
Attending: THORACIC SURGERY (CARDIOTHORACIC VASCULAR SURGERY) | Admitting: THORACIC SURGERY (CARDIOTHORACIC VASCULAR SURGERY)

## 2018-05-29 ENCOUNTER — APPOINTMENT (OUTPATIENT)
Dept: GENERAL RADIOLOGY | Facility: HOSPITAL | Age: 63
End: 2018-05-29

## 2018-05-29 ENCOUNTER — ANCILLARY PROCEDURE (OUTPATIENT)
Dept: PERIOP | Facility: HOSPITAL | Age: 63
End: 2018-05-29
Attending: ANESTHESIOLOGY

## 2018-05-29 ENCOUNTER — ANESTHESIA (OUTPATIENT)
Dept: PERIOP | Facility: HOSPITAL | Age: 63
End: 2018-05-29

## 2018-05-29 DIAGNOSIS — I25.10 ASHD (ARTERIOSCLEROTIC HEART DISEASE): ICD-10-CM

## 2018-05-29 DIAGNOSIS — I25.10 CORONARY ARTERY DISEASE INVOLVING NATIVE HEART WITHOUT ANGINA PECTORIS, UNSPECIFIED VESSEL OR LESION TYPE: ICD-10-CM

## 2018-05-29 DIAGNOSIS — R53.1 GENERALIZED WEAKNESS: Primary | ICD-10-CM

## 2018-05-29 LAB
ALBUMIN SERPL-MCNC: 3.4 G/DL (ref 3.5–5.2)
ALBUMIN SERPL-MCNC: 3.5 G/DL (ref 3.5–5.2)
ANION GAP SERPL CALCULATED.3IONS-SCNC: 14.7 MMOL/L
ANION GAP SERPL CALCULATED.3IONS-SCNC: 9 MMOL/L
APTT PPP: 32.7 SECONDS (ref 22.7–35.4)
ARTERIAL PATENCY WRIST A: ABNORMAL
ATMOSPHERIC PRESS: 744.7 MMHG
ATMOSPHERIC PRESS: 744.8 MMHG
ATMOSPHERIC PRESS: 745.9 MMHG
BASE EXCESS BLDA CALC-SCNC: -1 MMOL/L (ref -5–5)
BASE EXCESS BLDA CALC-SCNC: -1.3 MMOL/L (ref 0–2)
BASE EXCESS BLDA CALC-SCNC: -1.6 MMOL/L (ref 0–2)
BASE EXCESS BLDA CALC-SCNC: -1.9 MMOL/L (ref 0–2)
BASE EXCESS BLDA CALC-SCNC: -2 MMOL/L (ref -5–5)
BASE EXCESS BLDA CALC-SCNC: 1 MMOL/L (ref -5–5)
BASOPHILS # BLD AUTO: 0.02 10*3/MM3 (ref 0–0.2)
BASOPHILS NFR BLD AUTO: 0.1 % (ref 0–1.5)
BDY SITE: ABNORMAL
BUN BLD-MCNC: 18 MG/DL (ref 8–23)
BUN BLD-MCNC: 21 MG/DL (ref 8–23)
BUN/CREAT SERPL: 19.4 (ref 7–25)
BUN/CREAT SERPL: 20.6 (ref 7–25)
CA-I BLD-MCNC: 4.7 MG/DL (ref 4.6–5.4)
CA-I SERPL ISE-MCNC: 1.17 MMOL/L (ref 1.15–1.35)
CALCIUM SPEC-SCNC: 7.9 MG/DL (ref 8.6–10.5)
CALCIUM SPEC-SCNC: 8.1 MG/DL (ref 8.6–10.5)
CHLORIDE SERPL-SCNC: 107 MMOL/L (ref 98–107)
CHLORIDE SERPL-SCNC: 109 MMOL/L (ref 98–107)
CO2 BLDA-SCNC: 25 MMOL/L (ref 24–29)
CO2 BLDA-SCNC: 26 MMOL/L (ref 24–29)
CO2 BLDA-SCNC: 28 MMOL/L (ref 24–29)
CO2 SERPL-SCNC: 19.3 MMOL/L (ref 22–29)
CO2 SERPL-SCNC: 23 MMOL/L (ref 22–29)
CREAT BLD-MCNC: 0.93 MG/DL (ref 0.76–1.27)
CREAT BLD-MCNC: 1.02 MG/DL (ref 0.76–1.27)
DEPRECATED RDW RBC AUTO: 41.9 FL (ref 37–54)
DEPRECATED RDW RBC AUTO: 42 FL (ref 37–54)
EOSINOPHIL # BLD AUTO: 0.09 10*3/MM3 (ref 0–0.7)
EOSINOPHIL NFR BLD AUTO: 0.6 % (ref 0.3–6.2)
ERYTHROCYTE [DISTWIDTH] IN BLOOD BY AUTOMATED COUNT: 13 % (ref 11.5–14.5)
ERYTHROCYTE [DISTWIDTH] IN BLOOD BY AUTOMATED COUNT: 13 % (ref 11.5–14.5)
FIBRINOGEN PPP-MCNC: 211 MG/DL (ref 219–464)
GFR SERPL CREATININE-BSD FRML MDRD: 74 ML/MIN/1.73
GFR SERPL CREATININE-BSD FRML MDRD: 82 ML/MIN/1.73
GLUCOSE BLD-MCNC: 132 MG/DL (ref 65–99)
GLUCOSE BLD-MCNC: 139 MG/DL (ref 65–99)
GLUCOSE BLDC GLUCOMTR-MCNC: 124 MG/DL (ref 70–130)
GLUCOSE BLDC GLUCOMTR-MCNC: 130 MG/DL (ref 70–130)
GLUCOSE BLDC GLUCOMTR-MCNC: 134 MG/DL (ref 70–130)
GLUCOSE BLDC GLUCOMTR-MCNC: 136 MG/DL (ref 70–130)
GLUCOSE BLDC GLUCOMTR-MCNC: 139 MG/DL (ref 70–130)
GLUCOSE BLDC GLUCOMTR-MCNC: 140 MG/DL (ref 70–130)
GLUCOSE BLDC GLUCOMTR-MCNC: 141 MG/DL (ref 70–130)
GLUCOSE BLDC GLUCOMTR-MCNC: 143 MG/DL (ref 70–130)
GLUCOSE BLDC GLUCOMTR-MCNC: 153 MG/DL (ref 70–130)
GLUCOSE BLDC GLUCOMTR-MCNC: 166 MG/DL (ref 70–130)
HCO3 BLDA-SCNC: 22.9 MMOL/L (ref 22–28)
HCO3 BLDA-SCNC: 23.5 MMOL/L (ref 22–28)
HCO3 BLDA-SCNC: 23.7 MMOL/L (ref 22–28)
HCO3 BLDA-SCNC: 23.9 MMOL/L (ref 22–26)
HCO3 BLDA-SCNC: 24.3 MMOL/L (ref 22–26)
HCO3 BLDA-SCNC: 24.6 MMOL/L (ref 22–26)
HCO3 BLDA-SCNC: 25 MMOL/L (ref 22–26)
HCO3 BLDA-SCNC: 26.8 MMOL/L (ref 22–26)
HCT VFR BLD AUTO: 31.5 % (ref 40.4–52.2)
HCT VFR BLD AUTO: 31.8 % (ref 40.4–52.2)
HCT VFR BLDA CALC: 26 % (ref 38–51)
HCT VFR BLDA CALC: 28 % (ref 38–51)
HCT VFR BLDA CALC: 29 % (ref 38–51)
HCT VFR BLDA CALC: 29 % (ref 38–51)
HCT VFR BLDA CALC: 38 % (ref 38–51)
HGB BLD-MCNC: 10.6 G/DL (ref 13.7–17.6)
HGB BLD-MCNC: 10.8 G/DL (ref 13.7–17.6)
HGB BLDA-MCNC: 12.9 G/DL (ref 12–17)
HGB BLDA-MCNC: 8.8 G/DL (ref 12–17)
HGB BLDA-MCNC: 9.5 G/DL (ref 12–17)
HGB BLDA-MCNC: 9.9 G/DL (ref 12–17)
HGB BLDA-MCNC: 9.9 G/DL (ref 12–17)
HOROWITZ INDEX BLD+IHG-RTO: 100 %
HOROWITZ INDEX BLD+IHG-RTO: 40 %
HOROWITZ INDEX BLD+IHG-RTO: 40 %
IMM GRANULOCYTES # BLD: 0.07 10*3/MM3 (ref 0–0.03)
IMM GRANULOCYTES NFR BLD: 0.5 % (ref 0–0.5)
INR PPP: 1.37 (ref 0.9–1.1)
LYMPHOCYTES # BLD AUTO: 2.07 10*3/MM3 (ref 0.9–4.8)
LYMPHOCYTES NFR BLD AUTO: 14.9 % (ref 19.6–45.3)
MAGNESIUM SERPL-MCNC: 2.4 MG/DL (ref 1.6–2.4)
MAGNESIUM SERPL-MCNC: 2.4 MG/DL (ref 1.6–2.4)
MCH RBC QN AUTO: 29.7 PG (ref 27–32.7)
MCH RBC QN AUTO: 30 PG (ref 27–32.7)
MCHC RBC AUTO-ENTMCNC: 33.7 G/DL (ref 32.6–36.4)
MCHC RBC AUTO-ENTMCNC: 34 G/DL (ref 32.6–36.4)
MCV RBC AUTO: 87.4 FL (ref 79.8–96.2)
MCV RBC AUTO: 89.2 FL (ref 79.8–96.2)
MODALITY: ABNORMAL
MONOCYTES # BLD AUTO: 1.02 10*3/MM3 (ref 0.2–1.2)
MONOCYTES NFR BLD AUTO: 7.3 % (ref 5–12)
NEUTROPHILS # BLD AUTO: 10.7 10*3/MM3 (ref 1.9–8.1)
NEUTROPHILS NFR BLD AUTO: 77.1 % (ref 42.7–76)
O2 A-A PPRESDIFF RESPIRATORY: 0.4 MMHG
O2 A-A PPRESDIFF RESPIRATORY: 0.5 MMHG
O2 A-A PPRESDIFF RESPIRATORY: 0.5 MMHG
PCO2 BLDA: 38.1 MM HG (ref 35–45)
PCO2 BLDA: 38.8 MM HG (ref 35–45)
PCO2 BLDA: 41 MM HG (ref 35–45)
PCO2 BLDA: 41.8 MM HG (ref 35–45)
PCO2 BLDA: 42.2 MM HG (ref 35–45)
PCO2 BLDA: 45.3 MM HG (ref 35–45)
PCO2 BLDA: 49.9 MM HG (ref 35–45)
PCO2 BLDA: 53.2 MM HG (ref 35–45)
PEEP RESPIRATORY: 7.5 CM[H2O]
PH BLDA: 7.31 PH UNITS (ref 7.35–7.6)
PH BLDA: 7.31 PH UNITS (ref 7.35–7.6)
PH BLDA: 7.34 PH UNITS (ref 7.35–7.6)
PH BLDA: 7.36 PH UNITS (ref 7.35–7.6)
PH BLDA: 7.37 PH UNITS (ref 7.35–7.45)
PH BLDA: 7.37 PH UNITS (ref 7.35–7.6)
PH BLDA: 7.39 PH UNITS (ref 7.35–7.45)
PH BLDA: 7.39 PH UNITS (ref 7.35–7.45)
PHOSPHATE SERPL-MCNC: 3.3 MG/DL (ref 2.5–4.5)
PHOSPHATE SERPL-MCNC: 3.4 MG/DL (ref 2.5–4.5)
PLATELET # BLD AUTO: 112 10*3/MM3 (ref 140–500)
PLATELET # BLD AUTO: 137 10*3/MM3 (ref 140–500)
PMV BLD AUTO: 9.5 FL (ref 6–12)
PMV BLD AUTO: 9.6 FL (ref 6–12)
PO2 BLDA: 106.8 MM HG (ref 80–100)
PO2 BLDA: 112.9 MM HG (ref 80–100)
PO2 BLDA: 306 MMHG (ref 80–105)
PO2 BLDA: 349 MMHG (ref 80–105)
PO2 BLDA: 357.7 MM HG (ref 80–100)
PO2 BLDA: 378 MMHG (ref 80–105)
PO2 BLDA: 42 MMHG (ref 80–105)
PO2 BLDA: 78 MMHG (ref 80–105)
POTASSIUM BLD-SCNC: 4.2 MMOL/L (ref 3.5–5.2)
POTASSIUM BLD-SCNC: 4.2 MMOL/L (ref 3.5–5.2)
POTASSIUM BLDA-SCNC: 4.1 MMOL/L (ref 3.5–4.9)
POTASSIUM BLDA-SCNC: 4.1 MMOL/L (ref 3.5–4.9)
POTASSIUM BLDA-SCNC: 4.3 MMOL/L (ref 3.5–4.9)
POTASSIUM BLDA-SCNC: 4.3 MMOL/L (ref 3.5–4.9)
POTASSIUM BLDA-SCNC: 4.5 MMOL/L (ref 3.5–4.9)
PROTHROMBIN TIME: 16.6 SECONDS (ref 11.7–14.2)
PSV: 5 CMH2O
RBC # BLD AUTO: 3.53 10*6/MM3 (ref 4.6–6)
RBC # BLD AUTO: 3.64 10*6/MM3 (ref 4.6–6)
SAO2 % BLDA: 100 % (ref 95–98)
SAO2 % BLDA: 72 % (ref 95–98)
SAO2 % BLDA: 95 % (ref 95–98)
SAO2 % BLDCOA: 98 % (ref 92–99)
SAO2 % BLDCOA: 98.4 % (ref 92–99)
SAO2 % BLDCOA: 99.9 % (ref 92–99)
SET MECH RESP RATE: 12
SET MECH RESP RATE: 12
SET MECH RESP RATE: 22
SODIUM BLD-SCNC: 141 MMOL/L (ref 136–145)
SODIUM BLD-SCNC: 141 MMOL/L (ref 136–145)
TOTAL RATE: 12 BREATHS/MINUTE
TOTAL RATE: 18 BREATHS/MINUTE
TOTAL RATE: 22 BREATHS/MINUTE
VENTILATOR MODE: ABNORMAL
VENTILATOR MODE: ABNORMAL
VENTILATOR MODE: AC
VT ON VENT VENT: 700 ML
VT ON VENT VENT: 700 ML
VT ON VENT VENT: 705 ML
WBC NRBC COR # BLD: 11.14 10*3/MM3 (ref 4.5–10.7)
WBC NRBC COR # BLD: 13.9 10*3/MM3 (ref 4.5–10.7)

## 2018-05-29 PROCEDURE — 25010000002 PHENYLEPHRINE PER 1 ML: Performed by: ANESTHESIOLOGY

## 2018-05-29 PROCEDURE — 06BQ4ZZ EXCISION OF LEFT SAPHENOUS VEIN, PERCUTANEOUS ENDOSCOPIC APPROACH: ICD-10-PCS | Performed by: THORACIC SURGERY (CARDIOTHORACIC VASCULAR SURGERY)

## 2018-05-29 PROCEDURE — 25010000002 AMIODARONE IN DEXTROSE 5% 360-4.14 MG/200ML-% SOLUTION: Performed by: ANESTHESIOLOGY

## 2018-05-29 PROCEDURE — 94799 UNLISTED PULMONARY SVC/PX: CPT

## 2018-05-29 PROCEDURE — C1713 ANCHOR/SCREW BN/BN,TIS/BN: HCPCS | Performed by: THORACIC SURGERY (CARDIOTHORACIC VASCULAR SURGERY)

## 2018-05-29 PROCEDURE — 85610 PROTHROMBIN TIME: CPT | Performed by: THORACIC SURGERY (CARDIOTHORACIC VASCULAR SURGERY)

## 2018-05-29 PROCEDURE — 25010000002 HEPARIN (PORCINE) PER 1000 UNITS: Performed by: ANESTHESIOLOGY

## 2018-05-29 PROCEDURE — B246ZZ4 ULTRASONOGRAPHY OF RIGHT AND LEFT HEART, TRANSESOPHAGEAL: ICD-10-PCS | Performed by: THORACIC SURGERY (CARDIOTHORACIC VASCULAR SURGERY)

## 2018-05-29 PROCEDURE — 82330 ASSAY OF CALCIUM: CPT | Performed by: THORACIC SURGERY (CARDIOTHORACIC VASCULAR SURGERY)

## 2018-05-29 PROCEDURE — 25010000002 PROPOFOL 10 MG/ML EMULSION: Performed by: ANESTHESIOLOGY

## 2018-05-29 PROCEDURE — 5A1221Z PERFORMANCE OF CARDIAC OUTPUT, CONTINUOUS: ICD-10-PCS | Performed by: THORACIC SURGERY (CARDIOTHORACIC VASCULAR SURGERY)

## 2018-05-29 PROCEDURE — 85027 COMPLETE CBC AUTOMATED: CPT | Performed by: THORACIC SURGERY (CARDIOTHORACIC VASCULAR SURGERY)

## 2018-05-29 PROCEDURE — 25010000002 AMIODARONE IN DEXTROSE 5% 360-4.14 MG/200ML-% SOLUTION: Performed by: THORACIC SURGERY (CARDIOTHORACIC VASCULAR SURGERY)

## 2018-05-29 PROCEDURE — 25010000003 CEFAZOLIN IN DEXTROSE 2-4 GM/100ML-% SOLUTION: Performed by: THORACIC SURGERY (CARDIOTHORACIC VASCULAR SURGERY)

## 2018-05-29 PROCEDURE — 63710000001 INSULIN REGULAR HUMAN PER 5 UNITS: Performed by: ANESTHESIOLOGY

## 2018-05-29 PROCEDURE — 25010000002 PROPOFOL 1000 MG/ML EMULSION: Performed by: THORACIC SURGERY (CARDIOTHORACIC VASCULAR SURGERY)

## 2018-05-29 PROCEDURE — 94002 VENT MGMT INPAT INIT DAY: CPT

## 2018-05-29 PROCEDURE — 25010000003 CEFAZOLIN IN DEXTROSE 2-4 GM/100ML-% SOLUTION: Performed by: NURSE PRACTITIONER

## 2018-05-29 PROCEDURE — 82947 ASSAY GLUCOSE BLOOD QUANT: CPT

## 2018-05-29 PROCEDURE — 33518 CABG ARTERY-VEIN TWO: CPT | Performed by: THORACIC SURGERY (CARDIOTHORACIC VASCULAR SURGERY)

## 2018-05-29 PROCEDURE — 33518 CABG ARTERY-VEIN TWO: CPT | Performed by: PHYSICIAN ASSISTANT

## 2018-05-29 PROCEDURE — 85025 COMPLETE CBC W/AUTO DIFF WBC: CPT | Performed by: THORACIC SURGERY (CARDIOTHORACIC VASCULAR SURGERY)

## 2018-05-29 PROCEDURE — 33508 ENDOSCOPIC VEIN HARVEST: CPT | Performed by: PHYSICIAN ASSISTANT

## 2018-05-29 PROCEDURE — 85018 HEMOGLOBIN: CPT

## 2018-05-29 PROCEDURE — 25010000002 EPINEPHRINE PER 0.1 MG: Performed by: ANESTHESIOLOGY

## 2018-05-29 PROCEDURE — 25010000002 PROTAMINE SULFATE PER 10 MG: Performed by: ANESTHESIOLOGY

## 2018-05-29 PROCEDURE — 85384 FIBRINOGEN ACTIVITY: CPT | Performed by: THORACIC SURGERY (CARDIOTHORACIC VASCULAR SURGERY)

## 2018-05-29 PROCEDURE — 25010000002 ALBUMIN HUMAN 25% PER 50 ML

## 2018-05-29 PROCEDURE — 33533 CABG ARTERIAL SINGLE: CPT | Performed by: PHYSICIAN ASSISTANT

## 2018-05-29 PROCEDURE — 02100Z9 BYPASS CORONARY ARTERY, ONE ARTERY FROM LEFT INTERNAL MAMMARY, OPEN APPROACH: ICD-10-PCS | Performed by: THORACIC SURGERY (CARDIOTHORACIC VASCULAR SURGERY)

## 2018-05-29 PROCEDURE — C1769 GUIDE WIRE: HCPCS | Performed by: THORACIC SURGERY (CARDIOTHORACIC VASCULAR SURGERY)

## 2018-05-29 PROCEDURE — 25010000002 MAGNESIUM SULFATE IN D5W 1G/100ML (PREMIX) 1-5 GM/100ML-% SOLUTION: Performed by: THORACIC SURGERY (CARDIOTHORACIC VASCULAR SURGERY)

## 2018-05-29 PROCEDURE — P9041 ALBUMIN (HUMAN),5%, 50ML: HCPCS | Performed by: THORACIC SURGERY (CARDIOTHORACIC VASCULAR SURGERY)

## 2018-05-29 PROCEDURE — 83735 ASSAY OF MAGNESIUM: CPT | Performed by: THORACIC SURGERY (CARDIOTHORACIC VASCULAR SURGERY)

## 2018-05-29 PROCEDURE — 25010000002 MIDAZOLAM PER 1 MG: Performed by: ANESTHESIOLOGY

## 2018-05-29 PROCEDURE — 82803 BLOOD GASES ANY COMBINATION: CPT

## 2018-05-29 PROCEDURE — 25010000002 HEPARIN (PORCINE) PER 1000 UNITS

## 2018-05-29 PROCEDURE — 25010000002 FENTANYL CITRATE (PF) 100 MCG/2ML SOLUTION: Performed by: ANESTHESIOLOGY

## 2018-05-29 PROCEDURE — 71045 X-RAY EXAM CHEST 1 VIEW: CPT

## 2018-05-29 PROCEDURE — 85730 THROMBOPLASTIN TIME PARTIAL: CPT | Performed by: THORACIC SURGERY (CARDIOTHORACIC VASCULAR SURGERY)

## 2018-05-29 PROCEDURE — 93318 ECHO TRANSESOPHAGEAL INTRAOP: CPT | Performed by: ANESTHESIOLOGY

## 2018-05-29 PROCEDURE — 3E080GC INTRODUCTION OF OTHER THERAPEUTIC SUBSTANCE INTO HEART, OPEN APPROACH: ICD-10-PCS | Performed by: THORACIC SURGERY (CARDIOTHORACIC VASCULAR SURGERY)

## 2018-05-29 PROCEDURE — 25010000002 ONDANSETRON PER 1 MG: Performed by: ANESTHESIOLOGY

## 2018-05-29 PROCEDURE — 25010000002 PAPAVERINE PER 60 MG: Performed by: THORACIC SURGERY (CARDIOTHORACIC VASCULAR SURGERY)

## 2018-05-29 PROCEDURE — 82962 GLUCOSE BLOOD TEST: CPT

## 2018-05-29 PROCEDURE — C1751 CATH, INF, PER/CENT/MIDLINE: HCPCS | Performed by: ANESTHESIOLOGY

## 2018-05-29 PROCEDURE — 021109W BYPASS CORONARY ARTERY, TWO ARTERIES FROM AORTA WITH AUTOLOGOUS VENOUS TISSUE, OPEN APPROACH: ICD-10-PCS | Performed by: THORACIC SURGERY (CARDIOTHORACIC VASCULAR SURGERY)

## 2018-05-29 PROCEDURE — 25010000002 MORPHINE PER 10 MG: Performed by: THORACIC SURGERY (CARDIOTHORACIC VASCULAR SURGERY)

## 2018-05-29 PROCEDURE — 85347 COAGULATION TIME ACTIVATED: CPT

## 2018-05-29 PROCEDURE — 33533 CABG ARTERIAL SINGLE: CPT | Performed by: THORACIC SURGERY (CARDIOTHORACIC VASCULAR SURGERY)

## 2018-05-29 PROCEDURE — 85014 HEMATOCRIT: CPT

## 2018-05-29 PROCEDURE — 25010000002 AMIODARONE PER 30 MG: Performed by: ANESTHESIOLOGY

## 2018-05-29 PROCEDURE — 25010000002 HEPARIN (PORCINE) PER 1000 UNITS: Performed by: THORACIC SURGERY (CARDIOTHORACIC VASCULAR SURGERY)

## 2018-05-29 PROCEDURE — C1729 CATH, DRAINAGE: HCPCS | Performed by: THORACIC SURGERY (CARDIOTHORACIC VASCULAR SURGERY)

## 2018-05-29 PROCEDURE — P9046 ALBUMIN (HUMAN), 25%, 20 ML: HCPCS

## 2018-05-29 PROCEDURE — 33508 ENDOSCOPIC VEIN HARVEST: CPT | Performed by: THORACIC SURGERY (CARDIOTHORACIC VASCULAR SURGERY)

## 2018-05-29 PROCEDURE — 93010 ELECTROCARDIOGRAM REPORT: CPT | Performed by: INTERNAL MEDICINE

## 2018-05-29 PROCEDURE — 80069 RENAL FUNCTION PANEL: CPT | Performed by: THORACIC SURGERY (CARDIOTHORACIC VASCULAR SURGERY)

## 2018-05-29 PROCEDURE — 25010000002 ALBUMIN HUMAN 5% PER 50 ML: Performed by: THORACIC SURGERY (CARDIOTHORACIC VASCULAR SURGERY)

## 2018-05-29 PROCEDURE — A4648 IMPLANTABLE TISSUE MARKER: HCPCS | Performed by: THORACIC SURGERY (CARDIOTHORACIC VASCULAR SURGERY)

## 2018-05-29 PROCEDURE — 93005 ELECTROCARDIOGRAM TRACING: CPT | Performed by: THORACIC SURGERY (CARDIOTHORACIC VASCULAR SURGERY)

## 2018-05-29 PROCEDURE — 25010000002 METOCLOPRAMIDE PER 10 MG: Performed by: THORACIC SURGERY (CARDIOTHORACIC VASCULAR SURGERY)

## 2018-05-29 PROCEDURE — 25010000002 MAGNESIUM SULFATE PER 500 MG OF MAGNESIUM: Performed by: ANESTHESIOLOGY

## 2018-05-29 RX ORDER — LIDOCAINE HYDROCHLORIDE 10 MG/ML
0.5 INJECTION, SOLUTION EPIDURAL; INFILTRATION; INTRACAUDAL; PERINEURAL ONCE AS NEEDED
Status: DISCONTINUED | OUTPATIENT
Start: 2018-05-29 | End: 2018-05-29 | Stop reason: HOSPADM

## 2018-05-29 RX ORDER — SODIUM CHLORIDE, SODIUM LACTATE, POTASSIUM CHLORIDE, CALCIUM CHLORIDE 600; 310; 30; 20 MG/100ML; MG/100ML; MG/100ML; MG/100ML
9 INJECTION, SOLUTION INTRAVENOUS CONTINUOUS
Status: DISCONTINUED | OUTPATIENT
Start: 2018-05-29 | End: 2018-05-29

## 2018-05-29 RX ORDER — POTASSIUM CHLORIDE 29.8 MG/ML
20 INJECTION INTRAVENOUS
Status: DISCONTINUED | OUTPATIENT
Start: 2018-05-29 | End: 2018-06-02 | Stop reason: HOSPADM

## 2018-05-29 RX ORDER — MAGNESIUM SULFATE 1 G/100ML
1 INJECTION INTRAVENOUS AS NEEDED
Status: DISCONTINUED | OUTPATIENT
Start: 2018-05-29 | End: 2018-06-02 | Stop reason: HOSPADM

## 2018-05-29 RX ORDER — AMIODARONE HYDROCHLORIDE 50 MG/ML
INJECTION, SOLUTION INTRAVENOUS AS NEEDED
Status: DISCONTINUED | OUTPATIENT
Start: 2018-05-29 | End: 2018-05-29 | Stop reason: SURG

## 2018-05-29 RX ORDER — PROMETHAZINE HYDROCHLORIDE 25 MG/1
12.5 TABLET ORAL EVERY 6 HOURS PRN
Status: DISCONTINUED | OUTPATIENT
Start: 2018-05-29 | End: 2018-06-02 | Stop reason: HOSPADM

## 2018-05-29 RX ORDER — PANTOPRAZOLE SODIUM 40 MG/1
40 TABLET, DELAYED RELEASE ORAL DAILY
Status: DISCONTINUED | OUTPATIENT
Start: 2018-05-30 | End: 2018-06-02 | Stop reason: HOSPADM

## 2018-05-29 RX ORDER — ACETAMINOPHEN 325 MG/1
650 TABLET ORAL EVERY 4 HOURS PRN
Status: DISCONTINUED | OUTPATIENT
Start: 2018-05-29 | End: 2018-06-02 | Stop reason: HOSPADM

## 2018-05-29 RX ORDER — ALPRAZOLAM 0.25 MG/1
0.25 TABLET ORAL EVERY 8 HOURS PRN
Status: DISCONTINUED | OUTPATIENT
Start: 2018-05-29 | End: 2018-06-02 | Stop reason: HOSPADM

## 2018-05-29 RX ORDER — SODIUM CHLORIDE 9 MG/ML
9 INJECTION, SOLUTION INTRAVENOUS CONTINUOUS
Status: DISCONTINUED | OUTPATIENT
Start: 2018-05-29 | End: 2018-05-29

## 2018-05-29 RX ORDER — MIDAZOLAM HYDROCHLORIDE 1 MG/ML
2 INJECTION INTRAMUSCULAR; INTRAVENOUS
Status: COMPLETED | OUTPATIENT
Start: 2018-05-29 | End: 2018-05-29

## 2018-05-29 RX ORDER — PAPAVERINE HYDROCHLORIDE 30 MG/ML
INJECTION INTRAMUSCULAR; INTRAVENOUS AS NEEDED
Status: DISCONTINUED | OUTPATIENT
Start: 2018-05-29 | End: 2018-05-29 | Stop reason: HOSPADM

## 2018-05-29 RX ORDER — SENNA AND DOCUSATE SODIUM 50; 8.6 MG/1; MG/1
2 TABLET, FILM COATED ORAL NIGHTLY
Status: DISCONTINUED | OUTPATIENT
Start: 2018-05-30 | End: 2018-06-02 | Stop reason: HOSPADM

## 2018-05-29 RX ORDER — PROPOFOL 10 MG/ML
VIAL (ML) INTRAVENOUS AS NEEDED
Status: DISCONTINUED | OUTPATIENT
Start: 2018-05-29 | End: 2018-05-29 | Stop reason: SURG

## 2018-05-29 RX ORDER — BISACODYL 5 MG/1
10 TABLET, DELAYED RELEASE ORAL DAILY PRN
Status: DISCONTINUED | OUTPATIENT
Start: 2018-05-29 | End: 2018-06-02 | Stop reason: HOSPADM

## 2018-05-29 RX ORDER — ONDANSETRON 2 MG/ML
INJECTION INTRAMUSCULAR; INTRAVENOUS AS NEEDED
Status: DISCONTINUED | OUTPATIENT
Start: 2018-05-29 | End: 2018-05-29 | Stop reason: SURG

## 2018-05-29 RX ORDER — NITROGLYCERIN 5 MG/ML
INJECTION, SOLUTION INTRAVENOUS AS NEEDED
Status: DISCONTINUED | OUTPATIENT
Start: 2018-05-29 | End: 2018-05-29 | Stop reason: SURG

## 2018-05-29 RX ORDER — HEPARIN SODIUM 1000 [USP'U]/ML
INJECTION, SOLUTION INTRAVENOUS; SUBCUTANEOUS AS NEEDED
Status: DISCONTINUED | OUTPATIENT
Start: 2018-05-29 | End: 2018-05-29 | Stop reason: SURG

## 2018-05-29 RX ORDER — VECURONIUM BROMIDE 1 MG/ML
INJECTION, POWDER, LYOPHILIZED, FOR SOLUTION INTRAVENOUS AS NEEDED
Status: DISCONTINUED | OUTPATIENT
Start: 2018-05-29 | End: 2018-05-29 | Stop reason: SURG

## 2018-05-29 RX ORDER — FENTANYL CITRATE 50 UG/ML
50 INJECTION, SOLUTION INTRAMUSCULAR; INTRAVENOUS
Status: COMPLETED | OUTPATIENT
Start: 2018-05-29 | End: 2018-05-29

## 2018-05-29 RX ORDER — LIDOCAINE HYDROCHLORIDE 40 MG/ML
SOLUTION TOPICAL AS NEEDED
Status: DISCONTINUED | OUTPATIENT
Start: 2018-05-29 | End: 2018-05-29 | Stop reason: SURG

## 2018-05-29 RX ORDER — CHLORHEXIDINE GLUCONATE 0.12 MG/ML
15 RINSE ORAL ONCE
Status: DISCONTINUED | OUTPATIENT
Start: 2018-05-29 | End: 2018-05-29 | Stop reason: HOSPADM

## 2018-05-29 RX ORDER — POTASSIUM CHLORIDE 750 MG/1
40 CAPSULE, EXTENDED RELEASE ORAL AS NEEDED
Status: DISCONTINUED | OUTPATIENT
Start: 2018-05-29 | End: 2018-06-02 | Stop reason: HOSPADM

## 2018-05-29 RX ORDER — ONDANSETRON 2 MG/ML
4 INJECTION INTRAMUSCULAR; INTRAVENOUS EVERY 6 HOURS PRN
Status: DISCONTINUED | OUTPATIENT
Start: 2018-05-29 | End: 2018-06-02 | Stop reason: HOSPADM

## 2018-05-29 RX ORDER — ACETAMINOPHEN 650 MG/1
650 SUPPOSITORY RECTAL EVERY 4 HOURS PRN
Status: DISCONTINUED | OUTPATIENT
Start: 2018-05-29 | End: 2018-06-02

## 2018-05-29 RX ORDER — ASPIRIN 81 MG/1
81 TABLET ORAL DAILY
Status: DISCONTINUED | OUTPATIENT
Start: 2018-05-30 | End: 2018-06-02 | Stop reason: HOSPADM

## 2018-05-29 RX ORDER — CEFAZOLIN SODIUM 2 G/100ML
2 INJECTION, SOLUTION INTRAVENOUS ONCE
Status: COMPLETED | OUTPATIENT
Start: 2018-05-29 | End: 2018-05-29

## 2018-05-29 RX ORDER — SODIUM CHLORIDE 9 MG/ML
30 INJECTION, SOLUTION INTRAVENOUS CONTINUOUS PRN
Status: DISCONTINUED | OUTPATIENT
Start: 2018-05-29 | End: 2018-06-02 | Stop reason: HOSPADM

## 2018-05-29 RX ORDER — MILRINONE LACTATE 0.2 MG/ML
.25-.75 INJECTION, SOLUTION INTRAVENOUS CONTINUOUS PRN
Status: DISCONTINUED | OUTPATIENT
Start: 2018-05-29 | End: 2018-05-30

## 2018-05-29 RX ORDER — PROPOFOL 10 MG/ML
VIAL (ML) INTRAVENOUS CONTINUOUS PRN
Status: DISCONTINUED | OUTPATIENT
Start: 2018-05-29 | End: 2018-05-29 | Stop reason: SURG

## 2018-05-29 RX ORDER — SODIUM CHLORIDE 0.9 % (FLUSH) 0.9 %
30 SYRINGE (ML) INJECTION ONCE AS NEEDED
Status: DISCONTINUED | OUTPATIENT
Start: 2018-05-29 | End: 2018-06-02 | Stop reason: HOSPADM

## 2018-05-29 RX ORDER — NITROGLYCERIN 20 MG/100ML
5-200 INJECTION INTRAVENOUS
Status: DISCONTINUED | OUTPATIENT
Start: 2018-05-29 | End: 2018-05-30

## 2018-05-29 RX ORDER — PROMETHAZINE HYDROCHLORIDE 25 MG/ML
12.5 INJECTION, SOLUTION INTRAMUSCULAR; INTRAVENOUS EVERY 6 HOURS PRN
Status: DISCONTINUED | OUTPATIENT
Start: 2018-05-29 | End: 2018-06-02 | Stop reason: HOSPADM

## 2018-05-29 RX ORDER — POTASSIUM CHLORIDE 7.45 MG/ML
10 INJECTION INTRAVENOUS
Status: DISCONTINUED | OUTPATIENT
Start: 2018-05-29 | End: 2018-06-02 | Stop reason: HOSPADM

## 2018-05-29 RX ORDER — CHLORHEXIDINE GLUCONATE 500 MG/1
1 CLOTH TOPICAL EVERY 12 HOURS PRN
Status: DISCONTINUED | OUTPATIENT
Start: 2018-05-29 | End: 2018-05-29 | Stop reason: HOSPADM

## 2018-05-29 RX ORDER — MEPERIDINE HYDROCHLORIDE 25 MG/ML
25 INJECTION INTRAMUSCULAR; INTRAVENOUS; SUBCUTANEOUS EVERY 4 HOURS PRN
Status: DISCONTINUED | OUTPATIENT
Start: 2018-05-29 | End: 2018-05-30

## 2018-05-29 RX ORDER — MAGNESIUM SULFATE HEPTAHYDRATE 500 MG/ML
INJECTION, SOLUTION INTRAMUSCULAR; INTRAVENOUS AS NEEDED
Status: DISCONTINUED | OUTPATIENT
Start: 2018-05-29 | End: 2018-05-29 | Stop reason: SURG

## 2018-05-29 RX ORDER — PHENYLEPHRINE HCL IN 0.9% NACL 0.5 MG/5ML
.2-3 SYRINGE (ML) INTRAVENOUS CONTINUOUS PRN
Status: DISCONTINUED | OUTPATIENT
Start: 2018-05-29 | End: 2018-05-30

## 2018-05-29 RX ORDER — MAGNESIUM SULFATE HEPTAHYDRATE 40 MG/ML
2 INJECTION, SOLUTION INTRAVENOUS AS NEEDED
Status: DISCONTINUED | OUTPATIENT
Start: 2018-05-29 | End: 2018-06-02 | Stop reason: HOSPADM

## 2018-05-29 RX ORDER — PROTAMINE SULFATE 10 MG/ML
INJECTION, SOLUTION INTRAVENOUS AS NEEDED
Status: DISCONTINUED | OUTPATIENT
Start: 2018-05-29 | End: 2018-05-29 | Stop reason: SURG

## 2018-05-29 RX ORDER — MAGNESIUM SULFATE 1 G/100ML
1 INJECTION INTRAVENOUS EVERY 8 HOURS
Status: COMPLETED | OUTPATIENT
Start: 2018-05-29 | End: 2018-05-30

## 2018-05-29 RX ORDER — NALOXONE HCL 0.4 MG/ML
0.4 VIAL (ML) INJECTION
Status: DISCONTINUED | OUTPATIENT
Start: 2018-05-29 | End: 2018-05-30

## 2018-05-29 RX ORDER — SODIUM CHLORIDE 0.9 % (FLUSH) 0.9 %
1-10 SYRINGE (ML) INJECTION AS NEEDED
Status: DISCONTINUED | OUTPATIENT
Start: 2018-05-29 | End: 2018-05-29 | Stop reason: HOSPADM

## 2018-05-29 RX ORDER — CYCLOBENZAPRINE HCL 10 MG
10 TABLET ORAL EVERY 8 HOURS PRN
Status: DISCONTINUED | OUTPATIENT
Start: 2018-05-30 | End: 2018-06-02 | Stop reason: HOSPADM

## 2018-05-29 RX ORDER — SODIUM CHLORIDE 9 MG/ML
30 INJECTION, SOLUTION INTRAVENOUS CONTINUOUS
Status: DISCONTINUED | OUTPATIENT
Start: 2018-05-29 | End: 2018-06-02 | Stop reason: HOSPADM

## 2018-05-29 RX ORDER — CHLORHEXIDINE GLUCONATE 0.12 MG/ML
15 RINSE ORAL EVERY 12 HOURS
Status: DISCONTINUED | OUTPATIENT
Start: 2018-05-29 | End: 2018-06-02 | Stop reason: HOSPADM

## 2018-05-29 RX ORDER — CEFAZOLIN SODIUM 2 G/100ML
2 INJECTION, SOLUTION INTRAVENOUS EVERY 8 HOURS
Status: COMPLETED | OUTPATIENT
Start: 2018-05-29 | End: 2018-05-31

## 2018-05-29 RX ORDER — OXYCODONE HYDROCHLORIDE 5 MG/1
10 TABLET ORAL EVERY 4 HOURS PRN
Status: DISCONTINUED | OUTPATIENT
Start: 2018-05-29 | End: 2018-06-02 | Stop reason: HOSPADM

## 2018-05-29 RX ORDER — MIDAZOLAM HYDROCHLORIDE 1 MG/ML
INJECTION INTRAMUSCULAR; INTRAVENOUS AS NEEDED
Status: DISCONTINUED | OUTPATIENT
Start: 2018-05-29 | End: 2018-05-29 | Stop reason: SURG

## 2018-05-29 RX ORDER — DEXMEDETOMIDINE HYDROCHLORIDE 4 UG/ML
.2-1.5 INJECTION, SOLUTION INTRAVENOUS
Status: DISCONTINUED | OUTPATIENT
Start: 2018-05-29 | End: 2018-05-30

## 2018-05-29 RX ORDER — TRANEXAMIC ACID 100 MG/ML
INJECTION, SOLUTION INTRAVENOUS AS NEEDED
Status: DISCONTINUED | OUTPATIENT
Start: 2018-05-29 | End: 2018-05-29 | Stop reason: SURG

## 2018-05-29 RX ORDER — HEPARIN SODIUM 5000 [USP'U]/ML
INJECTION, SOLUTION INTRAVENOUS; SUBCUTANEOUS AS NEEDED
Status: DISCONTINUED | OUTPATIENT
Start: 2018-05-29 | End: 2018-05-29 | Stop reason: HOSPADM

## 2018-05-29 RX ORDER — FAMOTIDINE 10 MG/ML
20 INJECTION, SOLUTION INTRAVENOUS ONCE
Status: COMPLETED | OUTPATIENT
Start: 2018-05-29 | End: 2018-05-29

## 2018-05-29 RX ORDER — HYDROCODONE BITARTRATE AND ACETAMINOPHEN 5; 325 MG/1; MG/1
2 TABLET ORAL EVERY 4 HOURS PRN
Status: DISCONTINUED | OUTPATIENT
Start: 2018-05-29 | End: 2018-06-02 | Stop reason: HOSPADM

## 2018-05-29 RX ORDER — EPHEDRINE SULFATE 50 MG/ML
INJECTION, SOLUTION INTRAVENOUS AS NEEDED
Status: DISCONTINUED | OUTPATIENT
Start: 2018-05-29 | End: 2018-05-29 | Stop reason: SURG

## 2018-05-29 RX ORDER — FUROSEMIDE 10 MG/ML
40 INJECTION INTRAMUSCULAR; INTRAVENOUS EVERY 6 HOURS PRN
Status: DISCONTINUED | OUTPATIENT
Start: 2018-05-29 | End: 2018-05-30

## 2018-05-29 RX ORDER — SUFENTANIL CITRATE 50 UG/ML
INJECTION EPIDURAL; INTRAVENOUS AS NEEDED
Status: DISCONTINUED | OUTPATIENT
Start: 2018-05-29 | End: 2018-05-29 | Stop reason: SURG

## 2018-05-29 RX ORDER — DOPAMINE HYDROCHLORIDE 160 MG/100ML
2-20 INJECTION, SOLUTION INTRAVENOUS CONTINUOUS PRN
Status: DISCONTINUED | OUTPATIENT
Start: 2018-05-29 | End: 2018-05-30

## 2018-05-29 RX ORDER — ALBUMIN, HUMAN INJ 5% 5 %
1500 SOLUTION INTRAVENOUS AS NEEDED
Status: DISPENSED | OUTPATIENT
Start: 2018-05-29 | End: 2018-05-30

## 2018-05-29 RX ORDER — MIDAZOLAM HYDROCHLORIDE 1 MG/ML
1 INJECTION INTRAMUSCULAR; INTRAVENOUS
Status: COMPLETED | OUTPATIENT
Start: 2018-05-29 | End: 2018-05-29

## 2018-05-29 RX ORDER — METOCLOPRAMIDE HYDROCHLORIDE 5 MG/ML
10 INJECTION INTRAMUSCULAR; INTRAVENOUS EVERY 6 HOURS
Status: DISPENSED | OUTPATIENT
Start: 2018-05-29 | End: 2018-05-30

## 2018-05-29 RX ORDER — MORPHINE SULFATE 2 MG/ML
1 INJECTION, SOLUTION INTRAMUSCULAR; INTRAVENOUS EVERY 4 HOURS PRN
Status: DISCONTINUED | OUTPATIENT
Start: 2018-05-29 | End: 2018-05-30

## 2018-05-29 RX ORDER — MIDAZOLAM HYDROCHLORIDE 1 MG/ML
2 INJECTION INTRAMUSCULAR; INTRAVENOUS
Status: DISCONTINUED | OUTPATIENT
Start: 2018-05-29 | End: 2018-05-30

## 2018-05-29 RX ORDER — ROCURONIUM BROMIDE 10 MG/ML
INJECTION, SOLUTION INTRAVENOUS AS NEEDED
Status: DISCONTINUED | OUTPATIENT
Start: 2018-05-29 | End: 2018-05-29 | Stop reason: SURG

## 2018-05-29 RX ORDER — NITROGLYCERIN 20 MG/100ML
INJECTION INTRAVENOUS CONTINUOUS PRN
Status: DISCONTINUED | OUTPATIENT
Start: 2018-05-29 | End: 2018-05-29 | Stop reason: SURG

## 2018-05-29 RX ORDER — POTASSIUM CHLORIDE 1.5 G/1.77G
40 POWDER, FOR SOLUTION ORAL AS NEEDED
Status: DISCONTINUED | OUTPATIENT
Start: 2018-05-29 | End: 2018-06-02 | Stop reason: HOSPADM

## 2018-05-29 RX ORDER — BISACODYL 10 MG
10 SUPPOSITORY, RECTAL RECTAL DAILY PRN
Status: DISCONTINUED | OUTPATIENT
Start: 2018-05-30 | End: 2018-06-02 | Stop reason: HOSPADM

## 2018-05-29 RX ADMIN — ALBUMIN HUMAN 250 ML: 0.05 INJECTION, SOLUTION INTRAVENOUS at 16:53

## 2018-05-29 RX ADMIN — VECURONIUM BROMIDE 5 MG: 1 INJECTION, POWDER, LYOPHILIZED, FOR SOLUTION INTRAVENOUS at 15:13

## 2018-05-29 RX ADMIN — FAMOTIDINE 20 MG: 10 INJECTION, SOLUTION INTRAVENOUS at 17:54

## 2018-05-29 RX ADMIN — PHENYLEPHRINE HYDROCHLORIDE 50 MCG: 10 INJECTION INTRAVENOUS at 15:36

## 2018-05-29 RX ADMIN — MIDAZOLAM 2 MG: 1 INJECTION INTRAMUSCULAR; INTRAVENOUS at 09:11

## 2018-05-29 RX ADMIN — LIDOCAINE HYDROCHLORIDE 1 EACH: 40 SOLUTION TOPICAL at 12:51

## 2018-05-29 RX ADMIN — AMIODARONE HYDROCHLORIDE 150 MG: 50 INJECTION, SOLUTION INTRAVENOUS at 15:04

## 2018-05-29 RX ADMIN — NITROGLYCERIN 100 MCG: 5 INJECTION, SOLUTION INTRAVENOUS at 13:20

## 2018-05-29 RX ADMIN — NITROGLYCERIN 50 MCG: 5 INJECTION, SOLUTION INTRAVENOUS at 13:41

## 2018-05-29 RX ADMIN — SUFENTANIL CITRATE 50 MCG: 50 INJECTION, SOLUTION EPIDURAL; INTRAVENOUS at 15:28

## 2018-05-29 RX ADMIN — FAMOTIDINE 20 MG: 10 INJECTION, SOLUTION INTRAVENOUS at 09:11

## 2018-05-29 RX ADMIN — SODIUM CHLORIDE: 9 INJECTION, SOLUTION INTRAVENOUS at 12:30

## 2018-05-29 RX ADMIN — PHENYLEPHRINE HYDROCHLORIDE 50 MCG: 10 INJECTION INTRAVENOUS at 13:36

## 2018-05-29 RX ADMIN — AMIODARONE HYDROCHLORIDE 0.5 MG/MIN: 1.8 INJECTION, SOLUTION INTRAVENOUS at 20:28

## 2018-05-29 RX ADMIN — EPHEDRINE SULFATE 10 MG: 50 INJECTION INTRAMUSCULAR; INTRAVENOUS; SUBCUTANEOUS at 12:52

## 2018-05-29 RX ADMIN — FENTANYL CITRATE 50 MCG: 50 INJECTION INTRAMUSCULAR; INTRAVENOUS at 12:17

## 2018-05-29 RX ADMIN — TRANEXAMIC ACID 345 MG: 100 INJECTION, SOLUTION INTRAVENOUS at 15:00

## 2018-05-29 RX ADMIN — METOPROLOL TARTRATE 12.5 MG: 25 TABLET ORAL at 08:41

## 2018-05-29 RX ADMIN — MAGNESIUM SULFATE HEPTAHYDRATE 1 G: 1 INJECTION, SOLUTION INTRAVENOUS at 17:55

## 2018-05-29 RX ADMIN — Medication 5 MG: at 12:45

## 2018-05-29 RX ADMIN — PHENYLEPHRINE HYDROCHLORIDE 0.2 MCG/KG/MIN: 10 INJECTION, SOLUTION INTRAMUSCULAR; INTRAVENOUS; SUBCUTANEOUS at 15:55

## 2018-05-29 RX ADMIN — PROPOFOL 120 MG: 10 INJECTION, EMULSION INTRAVENOUS at 12:45

## 2018-05-29 RX ADMIN — SODIUM CHLORIDE 2 UNITS/HR: 900 INJECTION, SOLUTION INTRAVENOUS at 13:23

## 2018-05-29 RX ADMIN — ONDANSETRON 4 MG: 2 INJECTION INTRAMUSCULAR; INTRAVENOUS at 15:09

## 2018-05-29 RX ADMIN — FENTANYL CITRATE 50 MCG: 50 INJECTION INTRAMUSCULAR; INTRAVENOUS at 09:20

## 2018-05-29 RX ADMIN — FENTANYL CITRATE 50 MCG: 50 INJECTION INTRAMUSCULAR; INTRAVENOUS at 09:11

## 2018-05-29 RX ADMIN — CEFAZOLIN SODIUM 2 G: 2 INJECTION, SOLUTION INTRAVENOUS at 15:09

## 2018-05-29 RX ADMIN — CLEVIPIDINE 2 MG/HR: 0.5 EMULSION INTRAVENOUS at 17:48

## 2018-05-29 RX ADMIN — EPINEPHRINE 0.02 MCG/KG/MIN: 1 INJECTION, SOLUTION, CONCENTRATE INTRAVENOUS at 15:01

## 2018-05-29 RX ADMIN — PHENYLEPHRINE HYDROCHLORIDE 50 MCG: 10 INJECTION INTRAVENOUS at 15:32

## 2018-05-29 RX ADMIN — PROPOFOL 50 MCG/KG/MIN: 10 INJECTION, EMULSION INTRAVENOUS at 19:33

## 2018-05-29 RX ADMIN — AMIODARONE HYDROCHLORIDE 1 MG/MIN: 1.8 INJECTION, SOLUTION INTRAVENOUS at 15:07

## 2018-05-29 RX ADMIN — PHENYLEPHRINE HYDROCHLORIDE 100 MCG: 10 INJECTION INTRAVENOUS at 15:55

## 2018-05-29 RX ADMIN — TRANEXAMIC ACID 345 MG: 100 INJECTION, SOLUTION INTRAVENOUS at 14:02

## 2018-05-29 RX ADMIN — CEFAZOLIN SODIUM 2 G: 2 INJECTION, SOLUTION INTRAVENOUS at 23:00

## 2018-05-29 RX ADMIN — PHENYLEPHRINE HYDROCHLORIDE 50 MCG: 10 INJECTION INTRAVENOUS at 13:51

## 2018-05-29 RX ADMIN — MIDAZOLAM 2 MG: 1 INJECTION INTRAMUSCULAR; INTRAVENOUS at 09:55

## 2018-05-29 RX ADMIN — PROPOFOL 80 MG: 10 INJECTION, EMULSION INTRAVENOUS at 13:00

## 2018-05-29 RX ADMIN — ROCURONIUM BROMIDE 50 MG: 10 INJECTION INTRAVENOUS at 16:00

## 2018-05-29 RX ADMIN — CHLORHEXIDINE GLUCONATE 15 ML: 1.2 RINSE ORAL at 17:50

## 2018-05-29 RX ADMIN — TRANEXAMIC ACID 1000 MG: 100 INJECTION, SOLUTION INTRAVENOUS at 13:04

## 2018-05-29 RX ADMIN — MIDAZOLAM 1 MG: 1 INJECTION INTRAMUSCULAR; INTRAVENOUS at 09:20

## 2018-05-29 RX ADMIN — PHENYLEPHRINE HYDROCHLORIDE 100 MCG: 10 INJECTION INTRAVENOUS at 15:50

## 2018-05-29 RX ADMIN — TRANEXAMIC ACID 345 MG: 100 INJECTION, SOLUTION INTRAVENOUS at 16:00

## 2018-05-29 RX ADMIN — MORPHINE SULFATE 4 MG: 4 INJECTION INTRAVENOUS at 22:37

## 2018-05-29 RX ADMIN — VECURONIUM BROMIDE 10 MG: 1 INJECTION, POWDER, LYOPHILIZED, FOR SOLUTION INTRAVENOUS at 12:45

## 2018-05-29 RX ADMIN — SUFENTANIL CITRATE 50 MCG: 50 INJECTION, SOLUTION EPIDURAL; INTRAVENOUS at 12:45

## 2018-05-29 RX ADMIN — SUFENTANIL CITRATE 50 MCG: 50 INJECTION, SOLUTION EPIDURAL; INTRAVENOUS at 13:00

## 2018-05-29 RX ADMIN — PHENYLEPHRINE HYDROCHLORIDE 100 MCG: 10 INJECTION INTRAVENOUS at 13:54

## 2018-05-29 RX ADMIN — EPHEDRINE SULFATE 20 MG: 50 INJECTION INTRAMUSCULAR; INTRAVENOUS; SUBCUTANEOUS at 12:57

## 2018-05-29 RX ADMIN — SUFENTANIL CITRATE 50 MCG: 50 INJECTION, SOLUTION EPIDURAL; INTRAVENOUS at 13:18

## 2018-05-29 RX ADMIN — CEFAZOLIN SODIUM 2 G: 2 INJECTION, SOLUTION INTRAVENOUS at 12:45

## 2018-05-29 RX ADMIN — MUPIROCIN: 20 OINTMENT TOPICAL at 20:29

## 2018-05-29 RX ADMIN — PROTAMINE SULFATE 350 MG: 10 INJECTION, SOLUTION INTRAVENOUS at 15:10

## 2018-05-29 RX ADMIN — VECURONIUM BROMIDE 5 MG: 1 INJECTION, POWDER, LYOPHILIZED, FOR SOLUTION INTRAVENOUS at 13:56

## 2018-05-29 RX ADMIN — METOCLOPRAMIDE 10 MG: 5 INJECTION, SOLUTION INTRAMUSCULAR; INTRAVENOUS at 23:15

## 2018-05-29 RX ADMIN — PROPOFOL 50 MCG/KG/MIN: 10 INJECTION, EMULSION INTRAVENOUS at 13:49

## 2018-05-29 RX ADMIN — SUFENTANIL CITRATE 50 MCG: 50 INJECTION, SOLUTION EPIDURAL; INTRAVENOUS at 14:02

## 2018-05-29 RX ADMIN — PHENYLEPHRINE HYDROCHLORIDE 200 MCG: 10 INJECTION INTRAVENOUS at 14:10

## 2018-05-29 RX ADMIN — PHENYLEPHRINE HYDROCHLORIDE 100 MCG: 10 INJECTION INTRAVENOUS at 15:46

## 2018-05-29 RX ADMIN — Medication 5 MG: at 14:54

## 2018-05-29 RX ADMIN — MIDAZOLAM 1 MG: 1 INJECTION INTRAMUSCULAR; INTRAVENOUS at 09:38

## 2018-05-29 RX ADMIN — NITROGLYCERIN 0.25 MCG/KG/MIN: 20 INJECTION INTRAVENOUS at 13:03

## 2018-05-29 RX ADMIN — DEXMEDETOMIDINE 0.5 MCG/KG/HR: 100 INJECTION, SOLUTION, CONCENTRATE INTRAVENOUS at 19:33

## 2018-05-29 RX ADMIN — ALBUMIN HUMAN 250 ML: 0.05 INJECTION, SOLUTION INTRAVENOUS at 18:31

## 2018-05-29 RX ADMIN — HEPARIN SODIUM 30000 UNITS: 1000 INJECTION, SOLUTION INTRAVENOUS; SUBCUTANEOUS at 13:49

## 2018-05-29 RX ADMIN — METOCLOPRAMIDE 10 MG: 5 INJECTION, SOLUTION INTRAMUSCULAR; INTRAVENOUS at 17:50

## 2018-05-29 RX ADMIN — PHENYLEPHRINE HYDROCHLORIDE 100 MCG: 10 INJECTION INTRAVENOUS at 15:41

## 2018-05-29 RX ADMIN — SODIUM CHLORIDE 9 ML/HR: 9 INJECTION, SOLUTION INTRAVENOUS at 09:12

## 2018-05-29 RX ADMIN — MAGNESIUM SULFATE HEPTAHYDRATE 2 G: 500 INJECTION, SOLUTION INTRAMUSCULAR; INTRAVENOUS at 14:48

## 2018-05-29 NOTE — ANESTHESIA PROCEDURE NOTES
Arterial Line    Patient location during procedure: holding area  Start time: 5/29/2018 9:04 AM  Stop Time:5/29/2018 9:14 AM       Line placed for hemodynamic monitoring, ABGs/Labs/ISTAT and MD/Surgeon request.  Performed By   Anesthesiologist: MARY PABON  Preanesthetic Checklist  Completed: patient identified, site marked, surgical consent, pre-op evaluation, timeout performed, IV checked, risks and benefits discussed and monitors and equipment checked  Arterial Line Prep   Sterile Tech: gloves and sterile barriers  Prep: ChloraPrep  Patient monitoring: blood pressure monitoring, continuous pulse oximetry and EKG  Arterial Line Procedure   Laterality:left  Location:  radial artery  Catheter size: 20 G   Guidance: landmark technique  Number of attempts: 1  Successful placement: yes          Post Assessment   Dressing Type: occlusive dressing applied.   Complications no  Circ/Move/Sens Assessment: normal.   Patient Tolerance: patient tolerated the procedure well with no apparent complications

## 2018-05-29 NOTE — ANESTHESIA PROCEDURE NOTES
Central Line    Patient location during procedure: holding area  Start time: 5/29/2018 9:18 AM  Stop Time:5/29/2018 10:10 AM  Indications: central pressure monitoring  Staff  Anesthesiologist: MARY PABON  Preanesthetic Checklist  Completed: patient identified, site marked, surgical consent, pre-op evaluation, timeout performed, IV checked, risks and benefits discussed and monitors and equipment checked  Central Line Prep  Sterile Tech:cap, gloves, gown, mask and sterile barriers  Prep: chloraprep  Patient monitoring: blood pressure monitoring, continuous pulse oximetry and EKG  Central Line Procedure  Laterality:right  Location:internal jugular  Catheter Type:[place] and double lumen  Catheter Size:9 Fr  Guidance:ultrasound guided  PROCEDURE NOTE/ULTRASOUND INTERPRETATION.  Using ultrasound guidance the potential vascular sites for insertion of the catheter were visualized to determine the patency of the vessel to be used for vascular access.  After selecting the appropriate site for insertion, the needle was visualized under ultrasound being inserted into the internal jugular vein, followed by ultrasound confirmation of wire and catheter placement. There were no abnormalities seen on ultrasound; an image was taken; and the patient tolerated the procedure with no complications.   Assessment  Post procedure:biopatch applied, line sutured and occlusive dressing applied  Assessement:blood return through all ports, free fluid flow, no pneumothorax on x-ray, placement verified by x-ray and chest x-ray ordered  Complications:no  Patient Tolerance:patient tolerated the procedure well with no apparent complications

## 2018-05-29 NOTE — ANESTHESIA PROCEDURE NOTES
Procedure Performed: Emergent/Open-Heart Anesthesia SAGE     Start Time:        End Time:        General Procedure Information  SAGE Placed for monitoring purposes only -- This is not a diagnostic SAGE

## 2018-05-29 NOTE — ANESTHESIA POSTPROCEDURE EVALUATION
Patient: Mark Manzo    Procedure Summary     Date:  05/29/18 Room / Location:  [facility] OR [address] [place] [address] MAIN OR    Anesthesia Start:  1230 Anesthesia Stop:  1641    Procedure:  SAGE STERNOTOMY CORONARY ARTERY BYPASS GRAFT TIMES 3 USING LEFT INTERNAL MAMMARY ARTERY AND LEFT GREATER SAPHENOUS VEIN GRAFT PER ENDOSCOPIC VEIN HARVESTING AND PRP (N/A Chest) Diagnosis:       ASHD (arteriosclerotic heart disease)      Coronary artery disease involving native heart without angina pectoris, unspecified vessel or lesion type      (ASHD (arteriosclerotic heart disease) [I25.10])      (Coronary artery disease involving native heart without angina pectoris, unspecified vessel or lesion type [I25.10])    Surgeon:  Junior Ortega MD Provider:  Alfred Swanson MD    Anesthesia Type:  general ASA Status:  4          Anesthesia Type: general  Last vitals  BP   141/64 (05/29/18 1020)   Temp   36.8 °C (98.2 °F) (05/29/18 0819)   Pulse   74 (05/29/18 1633)   Resp   12 (05/29/18 1633)     SpO2   100 % (05/29/18 1633)     Post Anesthesia Care and Evaluation    Patient location during evaluation: PACU  Patient participation: complete - patient cannot participate  Level of consciousness: obtunded/minimal responses  Pain management: adequate  Airway patency: patent  Anesthetic complications: No anesthetic complications  PONV Status: NA  Cardiovascular status: acceptable  Respiratory status: acceptable, ETT, intubated and ventilator  Hydration status: acceptable

## 2018-05-30 ENCOUNTER — APPOINTMENT (OUTPATIENT)
Dept: GENERAL RADIOLOGY | Facility: HOSPITAL | Age: 63
End: 2018-05-30

## 2018-05-30 LAB
ALBUMIN SERPL-MCNC: 3.8 G/DL (ref 3.5–5.2)
ANION GAP SERPL CALCULATED.3IONS-SCNC: 13.5 MMOL/L
BASOPHILS # BLD AUTO: 0.01 10*3/MM3 (ref 0–0.2)
BASOPHILS NFR BLD AUTO: 0.1 % (ref 0–1.5)
BUN BLD-MCNC: 19 MG/DL (ref 8–23)
BUN/CREAT SERPL: 20 (ref 7–25)
CA-I BLD-MCNC: 4.8 MG/DL (ref 4.6–5.4)
CA-I SERPL ISE-MCNC: 1.21 MMOL/L (ref 1.15–1.35)
CALCIUM SPEC-SCNC: 7.8 MG/DL (ref 8.6–10.5)
CHLORIDE SERPL-SCNC: 106 MMOL/L (ref 98–107)
CO2 SERPL-SCNC: 20.5 MMOL/L (ref 22–29)
CREAT BLD-MCNC: 0.95 MG/DL (ref 0.76–1.27)
DEPRECATED RDW RBC AUTO: 41.7 FL (ref 37–54)
EOSINOPHIL # BLD AUTO: 0 10*3/MM3 (ref 0–0.7)
EOSINOPHIL NFR BLD AUTO: 0 % (ref 0.3–6.2)
ERYTHROCYTE [DISTWIDTH] IN BLOOD BY AUTOMATED COUNT: 12.9 % (ref 11.5–14.5)
GFR SERPL CREATININE-BSD FRML MDRD: 80 ML/MIN/1.73
GLUCOSE BLD-MCNC: 124 MG/DL (ref 65–99)
GLUCOSE BLDC GLUCOMTR-MCNC: 106 MG/DL (ref 70–130)
GLUCOSE BLDC GLUCOMTR-MCNC: 121 MG/DL (ref 70–130)
GLUCOSE BLDC GLUCOMTR-MCNC: 122 MG/DL (ref 70–130)
GLUCOSE BLDC GLUCOMTR-MCNC: 123 MG/DL (ref 70–130)
GLUCOSE BLDC GLUCOMTR-MCNC: 126 MG/DL (ref 70–130)
GLUCOSE BLDC GLUCOMTR-MCNC: 129 MG/DL (ref 70–130)
GLUCOSE BLDC GLUCOMTR-MCNC: 148 MG/DL (ref 70–130)
GLUCOSE BLDC GLUCOMTR-MCNC: 163 MG/DL (ref 70–130)
HCT VFR BLD AUTO: 30.8 % (ref 40.4–52.2)
HGB BLD-MCNC: 10.2 G/DL (ref 13.7–17.6)
IMM GRANULOCYTES # BLD: 0.02 10*3/MM3 (ref 0–0.03)
IMM GRANULOCYTES NFR BLD: 0.2 % (ref 0–0.5)
INR PPP: 1.24 (ref 0.9–1.1)
LYMPHOCYTES # BLD AUTO: 0.36 10*3/MM3 (ref 0.9–4.8)
LYMPHOCYTES NFR BLD AUTO: 4.3 % (ref 19.6–45.3)
MAGNESIUM SERPL-MCNC: 2.5 MG/DL (ref 1.6–2.4)
MCH RBC QN AUTO: 29.4 PG (ref 27–32.7)
MCHC RBC AUTO-ENTMCNC: 33.1 G/DL (ref 32.6–36.4)
MCV RBC AUTO: 88.8 FL (ref 79.8–96.2)
MONOCYTES # BLD AUTO: 0.51 10*3/MM3 (ref 0.2–1.2)
MONOCYTES NFR BLD AUTO: 6 % (ref 5–12)
NEUTROPHILS # BLD AUTO: 7.53 10*3/MM3 (ref 1.9–8.1)
NEUTROPHILS NFR BLD AUTO: 89.4 % (ref 42.7–76)
PHOSPHATE SERPL-MCNC: 3.4 MG/DL (ref 2.5–4.5)
PLATELET # BLD AUTO: 103 10*3/MM3 (ref 140–500)
PMV BLD AUTO: 10 FL (ref 6–12)
POTASSIUM BLD-SCNC: 4.1 MMOL/L (ref 3.5–5.2)
PROTHROMBIN TIME: 15.4 SECONDS (ref 11.7–14.2)
RBC # BLD AUTO: 3.47 10*6/MM3 (ref 4.6–6)
SODIUM BLD-SCNC: 140 MMOL/L (ref 136–145)
WBC NRBC COR # BLD: 8.43 10*3/MM3 (ref 4.5–10.7)

## 2018-05-30 PROCEDURE — 25010000003 CEFAZOLIN IN DEXTROSE 2-4 GM/100ML-% SOLUTION: Performed by: THORACIC SURGERY (CARDIOTHORACIC VASCULAR SURGERY)

## 2018-05-30 PROCEDURE — 25010000002 FUROSEMIDE PER 20 MG: Performed by: NURSE PRACTITIONER

## 2018-05-30 PROCEDURE — 86901 BLOOD TYPING SEROLOGIC RH(D): CPT

## 2018-05-30 PROCEDURE — 25010000002 ENOXAPARIN PER 10 MG: Performed by: THORACIC SURGERY (CARDIOTHORACIC VASCULAR SURGERY)

## 2018-05-30 PROCEDURE — 85610 PROTHROMBIN TIME: CPT | Performed by: THORACIC SURGERY (CARDIOTHORACIC VASCULAR SURGERY)

## 2018-05-30 PROCEDURE — 93005 ELECTROCARDIOGRAM TRACING: CPT | Performed by: THORACIC SURGERY (CARDIOTHORACIC VASCULAR SURGERY)

## 2018-05-30 PROCEDURE — 25010000002 METOCLOPRAMIDE PER 10 MG: Performed by: THORACIC SURGERY (CARDIOTHORACIC VASCULAR SURGERY)

## 2018-05-30 PROCEDURE — 25010000002 MAGNESIUM SULFATE IN D5W 1G/100ML (PREMIX) 1-5 GM/100ML-% SOLUTION: Performed by: THORACIC SURGERY (CARDIOTHORACIC VASCULAR SURGERY)

## 2018-05-30 PROCEDURE — 86900 BLOOD TYPING SEROLOGIC ABO: CPT

## 2018-05-30 PROCEDURE — 83735 ASSAY OF MAGNESIUM: CPT | Performed by: THORACIC SURGERY (CARDIOTHORACIC VASCULAR SURGERY)

## 2018-05-30 PROCEDURE — 97110 THERAPEUTIC EXERCISES: CPT | Performed by: OCCUPATIONAL THERAPIST

## 2018-05-30 PROCEDURE — 71045 X-RAY EXAM CHEST 1 VIEW: CPT

## 2018-05-30 PROCEDURE — 99222 1ST HOSP IP/OBS MODERATE 55: CPT | Performed by: INTERNAL MEDICINE

## 2018-05-30 PROCEDURE — 63710000001 INSULIN ASPART PER 5 UNITS: Performed by: THORACIC SURGERY (CARDIOTHORACIC VASCULAR SURGERY)

## 2018-05-30 PROCEDURE — 85025 COMPLETE CBC W/AUTO DIFF WBC: CPT | Performed by: THORACIC SURGERY (CARDIOTHORACIC VASCULAR SURGERY)

## 2018-05-30 PROCEDURE — 80069 RENAL FUNCTION PANEL: CPT | Performed by: THORACIC SURGERY (CARDIOTHORACIC VASCULAR SURGERY)

## 2018-05-30 PROCEDURE — 82962 GLUCOSE BLOOD TEST: CPT

## 2018-05-30 PROCEDURE — 82330 ASSAY OF CALCIUM: CPT | Performed by: THORACIC SURGERY (CARDIOTHORACIC VASCULAR SURGERY)

## 2018-05-30 PROCEDURE — 93010 ELECTROCARDIOGRAM REPORT: CPT | Performed by: INTERNAL MEDICINE

## 2018-05-30 PROCEDURE — 97110 THERAPEUTIC EXERCISES: CPT

## 2018-05-30 PROCEDURE — 97165 OT EVAL LOW COMPLEX 30 MIN: CPT | Performed by: OCCUPATIONAL THERAPIST

## 2018-05-30 PROCEDURE — 97162 PT EVAL MOD COMPLEX 30 MIN: CPT

## 2018-05-30 PROCEDURE — 94799 UNLISTED PULMONARY SVC/PX: CPT

## 2018-05-30 PROCEDURE — 25010000002 AMIODARONE IN DEXTROSE 5% 360-4.14 MG/200ML-% SOLUTION: Performed by: THORACIC SURGERY (CARDIOTHORACIC VASCULAR SURGERY)

## 2018-05-30 RX ORDER — AMIODARONE HYDROCHLORIDE 200 MG/1
400 TABLET ORAL EVERY 12 HOURS SCHEDULED
Status: DISCONTINUED | OUTPATIENT
Start: 2018-05-30 | End: 2018-06-01

## 2018-05-30 RX ORDER — DEXTROSE MONOHYDRATE 25 G/50ML
25 INJECTION, SOLUTION INTRAVENOUS
Status: DISCONTINUED | OUTPATIENT
Start: 2018-05-30 | End: 2018-06-02 | Stop reason: HOSPADM

## 2018-05-30 RX ORDER — NICOTINE POLACRILEX 4 MG
15 LOZENGE BUCCAL
Status: DISCONTINUED | OUTPATIENT
Start: 2018-05-30 | End: 2018-06-02 | Stop reason: HOSPADM

## 2018-05-30 RX ORDER — FUROSEMIDE 10 MG/ML
40 INJECTION INTRAMUSCULAR; INTRAVENOUS ONCE
Status: COMPLETED | OUTPATIENT
Start: 2018-05-30 | End: 2018-05-30

## 2018-05-30 RX ORDER — POTASSIUM CHLORIDE 750 MG/1
20 CAPSULE, EXTENDED RELEASE ORAL DAILY
Status: DISCONTINUED | OUTPATIENT
Start: 2018-05-30 | End: 2018-06-02 | Stop reason: HOSPADM

## 2018-05-30 RX ADMIN — CYCLOBENZAPRINE 10 MG: 10 TABLET, FILM COATED ORAL at 08:46

## 2018-05-30 RX ADMIN — ASPIRIN 81 MG: 81 TABLET ORAL at 08:57

## 2018-05-30 RX ADMIN — MUPIROCIN: 20 OINTMENT TOPICAL at 20:44

## 2018-05-30 RX ADMIN — CHLORHEXIDINE GLUCONATE 15 ML: 1.2 RINSE ORAL at 04:59

## 2018-05-30 RX ADMIN — HYDROCODONE BITARTRATE AND ACETAMINOPHEN 2 TABLET: 5; 325 TABLET ORAL at 08:47

## 2018-05-30 RX ADMIN — FUROSEMIDE 40 MG: 10 INJECTION, SOLUTION INTRAVENOUS at 08:48

## 2018-05-30 RX ADMIN — CEFAZOLIN SODIUM 2 G: 2 INJECTION, SOLUTION INTRAVENOUS at 07:08

## 2018-05-30 RX ADMIN — METOCLOPRAMIDE 10 MG: 5 INJECTION, SOLUTION INTRAMUSCULAR; INTRAVENOUS at 12:34

## 2018-05-30 RX ADMIN — POTASSIUM CHLORIDE 20 MEQ: 750 CAPSULE, EXTENDED RELEASE ORAL at 08:57

## 2018-05-30 RX ADMIN — METOPROLOL TARTRATE 25 MG: 25 TABLET ORAL at 08:47

## 2018-05-30 RX ADMIN — MAGNESIUM SULFATE HEPTAHYDRATE 1 G: 1 INJECTION, SOLUTION INTRAVENOUS at 09:05

## 2018-05-30 RX ADMIN — PANTOPRAZOLE SODIUM 40 MG: 40 TABLET, DELAYED RELEASE ORAL at 08:47

## 2018-05-30 RX ADMIN — ENOXAPARIN SODIUM 40 MG: 100 INJECTION SUBCUTANEOUS at 17:26

## 2018-05-30 RX ADMIN — METOPROLOL TARTRATE 25 MG: 25 TABLET ORAL at 20:44

## 2018-05-30 RX ADMIN — AMIODARONE HYDROCHLORIDE 400 MG: 200 TABLET ORAL at 08:47

## 2018-05-30 RX ADMIN — CEFAZOLIN SODIUM 2 G: 2 INJECTION, SOLUTION INTRAVENOUS at 23:21

## 2018-05-30 RX ADMIN — HYDROCODONE BITARTRATE AND ACETAMINOPHEN 2 TABLET: 5; 325 TABLET ORAL at 13:30

## 2018-05-30 RX ADMIN — CYCLOBENZAPRINE 10 MG: 10 TABLET, FILM COATED ORAL at 01:04

## 2018-05-30 RX ADMIN — MUPIROCIN: 20 OINTMENT TOPICAL at 08:48

## 2018-05-30 RX ADMIN — AMIODARONE HYDROCHLORIDE 400 MG: 200 TABLET ORAL at 20:44

## 2018-05-30 RX ADMIN — CYCLOBENZAPRINE 10 MG: 10 TABLET, FILM COATED ORAL at 17:25

## 2018-05-30 RX ADMIN — HYDROCODONE BITARTRATE AND ACETAMINOPHEN 2 TABLET: 5; 325 TABLET ORAL at 17:25

## 2018-05-30 RX ADMIN — HYDROCODONE BITARTRATE AND ACETAMINOPHEN 2 TABLET: 5; 325 TABLET ORAL at 05:09

## 2018-05-30 RX ADMIN — OXYCODONE HYDROCHLORIDE 10 MG: 5 TABLET ORAL at 23:58

## 2018-05-30 RX ADMIN — MAGNESIUM SULFATE HEPTAHYDRATE 1 G: 1 INJECTION, SOLUTION INTRAVENOUS at 01:15

## 2018-05-30 RX ADMIN — DOCUSATE SODIUM -SENNOSIDES 2 TABLET: 50; 8.6 TABLET, COATED ORAL at 20:44

## 2018-05-30 RX ADMIN — INSULIN ASPART 3 UNITS: 100 INJECTION, SOLUTION INTRAVENOUS; SUBCUTANEOUS at 20:44

## 2018-05-30 RX ADMIN — HYDROCODONE BITARTRATE AND ACETAMINOPHEN 2 TABLET: 5; 325 TABLET ORAL at 01:04

## 2018-05-30 RX ADMIN — CEFAZOLIN SODIUM 2 G: 2 INJECTION, SOLUTION INTRAVENOUS at 17:25

## 2018-05-30 RX ADMIN — AMIODARONE HYDROCHLORIDE 0.5 MG/MIN: 1.8 INJECTION, SOLUTION INTRAVENOUS at 07:44

## 2018-05-30 RX ADMIN — CHLORHEXIDINE GLUCONATE 15 ML: 1.2 RINSE ORAL at 17:26

## 2018-05-31 ENCOUNTER — APPOINTMENT (OUTPATIENT)
Dept: GENERAL RADIOLOGY | Facility: HOSPITAL | Age: 63
End: 2018-05-31

## 2018-05-31 LAB
ANION GAP SERPL CALCULATED.3IONS-SCNC: 13.9 MMOL/L
BUN BLD-MCNC: 17 MG/DL (ref 8–23)
BUN/CREAT SERPL: 16.5 (ref 7–25)
CALCIUM SPEC-SCNC: 8.5 MG/DL (ref 8.6–10.5)
CHLORIDE SERPL-SCNC: 104 MMOL/L (ref 98–107)
CO2 SERPL-SCNC: 20.1 MMOL/L (ref 22–29)
CREAT BLD-MCNC: 1.03 MG/DL (ref 0.76–1.27)
DEPRECATED RDW RBC AUTO: 43 FL (ref 37–54)
ERYTHROCYTE [DISTWIDTH] IN BLOOD BY AUTOMATED COUNT: 13.2 % (ref 11.5–14.5)
GFR SERPL CREATININE-BSD FRML MDRD: 73 ML/MIN/1.73
GLUCOSE BLD-MCNC: 165 MG/DL (ref 65–99)
GLUCOSE BLDC GLUCOMTR-MCNC: 141 MG/DL (ref 70–130)
GLUCOSE BLDC GLUCOMTR-MCNC: 146 MG/DL (ref 70–130)
GLUCOSE BLDC GLUCOMTR-MCNC: 180 MG/DL (ref 70–130)
GLUCOSE BLDC GLUCOMTR-MCNC: 200 MG/DL (ref 70–130)
HCT VFR BLD AUTO: 34.6 % (ref 40.4–52.2)
HGB BLD-MCNC: 11.5 G/DL (ref 13.7–17.6)
MCH RBC QN AUTO: 29.7 PG (ref 27–32.7)
MCHC RBC AUTO-ENTMCNC: 33.2 G/DL (ref 32.6–36.4)
MCV RBC AUTO: 89.4 FL (ref 79.8–96.2)
PLATELET # BLD AUTO: 127 10*3/MM3 (ref 140–500)
PMV BLD AUTO: 9.4 FL (ref 6–12)
POTASSIUM BLD-SCNC: 5.1 MMOL/L (ref 3.5–5.2)
RBC # BLD AUTO: 3.87 10*6/MM3 (ref 4.6–6)
SODIUM BLD-SCNC: 138 MMOL/L (ref 136–145)
WBC NRBC COR # BLD: 10.13 10*3/MM3 (ref 4.5–10.7)

## 2018-05-31 PROCEDURE — 93010 ELECTROCARDIOGRAM REPORT: CPT | Performed by: INTERNAL MEDICINE

## 2018-05-31 PROCEDURE — 94799 UNLISTED PULMONARY SVC/PX: CPT

## 2018-05-31 PROCEDURE — 99232 SBSQ HOSP IP/OBS MODERATE 35: CPT | Performed by: NURSE PRACTITIONER

## 2018-05-31 PROCEDURE — 82962 GLUCOSE BLOOD TEST: CPT

## 2018-05-31 PROCEDURE — 85027 COMPLETE CBC AUTOMATED: CPT | Performed by: THORACIC SURGERY (CARDIOTHORACIC VASCULAR SURGERY)

## 2018-05-31 PROCEDURE — 99024 POSTOP FOLLOW-UP VISIT: CPT | Performed by: THORACIC SURGERY (CARDIOTHORACIC VASCULAR SURGERY)

## 2018-05-31 PROCEDURE — 80048 BASIC METABOLIC PNL TOTAL CA: CPT | Performed by: THORACIC SURGERY (CARDIOTHORACIC VASCULAR SURGERY)

## 2018-05-31 PROCEDURE — 97110 THERAPEUTIC EXERCISES: CPT

## 2018-05-31 PROCEDURE — 71045 X-RAY EXAM CHEST 1 VIEW: CPT

## 2018-05-31 PROCEDURE — 25010000003 CEFAZOLIN IN DEXTROSE 2-4 GM/100ML-% SOLUTION: Performed by: THORACIC SURGERY (CARDIOTHORACIC VASCULAR SURGERY)

## 2018-05-31 PROCEDURE — 63710000001 INSULIN ASPART PER 5 UNITS: Performed by: THORACIC SURGERY (CARDIOTHORACIC VASCULAR SURGERY)

## 2018-05-31 PROCEDURE — 25010000002 FUROSEMIDE PER 20 MG: Performed by: NURSE PRACTITIONER

## 2018-05-31 PROCEDURE — 25010000002 ENOXAPARIN PER 10 MG: Performed by: THORACIC SURGERY (CARDIOTHORACIC VASCULAR SURGERY)

## 2018-05-31 PROCEDURE — 93005 ELECTROCARDIOGRAM TRACING: CPT | Performed by: THORACIC SURGERY (CARDIOTHORACIC VASCULAR SURGERY)

## 2018-05-31 PROCEDURE — 97110 THERAPEUTIC EXERCISES: CPT | Performed by: OCCUPATIONAL THERAPIST

## 2018-05-31 RX ORDER — GUAIFENESIN 600 MG/1
1200 TABLET, EXTENDED RELEASE ORAL EVERY 12 HOURS SCHEDULED
Status: DISCONTINUED | OUTPATIENT
Start: 2018-05-31 | End: 2018-06-02 | Stop reason: HOSPADM

## 2018-05-31 RX ORDER — FUROSEMIDE 10 MG/ML
40 INJECTION INTRAMUSCULAR; INTRAVENOUS ONCE
Status: COMPLETED | OUTPATIENT
Start: 2018-05-31 | End: 2018-05-31

## 2018-05-31 RX ORDER — POLYETHYLENE GLYCOL 3350 17 G/17G
17 POWDER, FOR SOLUTION ORAL DAILY
Status: DISCONTINUED | OUTPATIENT
Start: 2018-05-31 | End: 2018-06-02 | Stop reason: HOSPADM

## 2018-05-31 RX ADMIN — CEFAZOLIN SODIUM 2 G: 2 INJECTION, SOLUTION INTRAVENOUS at 06:21

## 2018-05-31 RX ADMIN — METOPROLOL TARTRATE 25 MG: 25 TABLET ORAL at 08:20

## 2018-05-31 RX ADMIN — PANTOPRAZOLE SODIUM 40 MG: 40 TABLET, DELAYED RELEASE ORAL at 08:20

## 2018-05-31 RX ADMIN — MUPIROCIN: 20 OINTMENT TOPICAL at 08:20

## 2018-05-31 RX ADMIN — ENOXAPARIN SODIUM 40 MG: 100 INJECTION SUBCUTANEOUS at 18:36

## 2018-05-31 RX ADMIN — METOPROLOL TARTRATE 12.5 MG: 25 TABLET ORAL at 10:34

## 2018-05-31 RX ADMIN — GUAIFENESIN 1200 MG: 600 TABLET, EXTENDED RELEASE ORAL at 10:35

## 2018-05-31 RX ADMIN — OXYCODONE HYDROCHLORIDE 10 MG: 5 TABLET ORAL at 06:21

## 2018-05-31 RX ADMIN — FUROSEMIDE 40 MG: 10 INJECTION, SOLUTION INTRAMUSCULAR; INTRAVENOUS at 10:34

## 2018-05-31 RX ADMIN — INSULIN ASPART 5 UNITS: 100 INJECTION, SOLUTION INTRAVENOUS; SUBCUTANEOUS at 21:22

## 2018-05-31 RX ADMIN — HYDROCODONE BITARTRATE AND ACETAMINOPHEN 2 TABLET: 5; 325 TABLET ORAL at 10:34

## 2018-05-31 RX ADMIN — AMIODARONE HYDROCHLORIDE 400 MG: 200 TABLET ORAL at 21:22

## 2018-05-31 RX ADMIN — INSULIN ASPART 2 UNITS: 100 INJECTION, SOLUTION INTRAVENOUS; SUBCUTANEOUS at 12:32

## 2018-05-31 RX ADMIN — HYDROCODONE BITARTRATE AND ACETAMINOPHEN 2 TABLET: 5; 325 TABLET ORAL at 18:36

## 2018-05-31 RX ADMIN — AMIODARONE HYDROCHLORIDE 400 MG: 200 TABLET ORAL at 08:20

## 2018-05-31 RX ADMIN — CHLORHEXIDINE GLUCONATE 15 ML: 1.2 RINSE ORAL at 05:07

## 2018-05-31 RX ADMIN — GUAIFENESIN 1200 MG: 600 TABLET, EXTENDED RELEASE ORAL at 21:22

## 2018-05-31 RX ADMIN — ASPIRIN 81 MG: 81 TABLET ORAL at 08:20

## 2018-05-31 RX ADMIN — POTASSIUM CHLORIDE 20 MEQ: 750 CAPSULE, EXTENDED RELEASE ORAL at 08:20

## 2018-05-31 RX ADMIN — MUPIROCIN: 20 OINTMENT TOPICAL at 21:22

## 2018-05-31 RX ADMIN — METOPROLOL TARTRATE 37.5 MG: 25 TABLET ORAL at 21:22

## 2018-06-01 ENCOUNTER — APPOINTMENT (OUTPATIENT)
Dept: GENERAL RADIOLOGY | Facility: HOSPITAL | Age: 63
End: 2018-06-01

## 2018-06-01 LAB
ACT BLD: 114 SECONDS (ref 82–152)
ACT BLD: 114 SECONDS (ref 82–152)
ACT BLD: 340 SECONDS (ref 82–152)
ACT BLD: 351 SECONDS (ref 82–152)
ACT BLD: 367 SECONDS (ref 82–152)
ANION GAP SERPL CALCULATED.3IONS-SCNC: 14.4 MMOL/L
BUN BLD-MCNC: 20 MG/DL (ref 8–23)
BUN/CREAT SERPL: 19.2 (ref 7–25)
CALCIUM SPEC-SCNC: 9 MG/DL (ref 8.6–10.5)
CHLORIDE SERPL-SCNC: 98 MMOL/L (ref 98–107)
CO2 SERPL-SCNC: 22.6 MMOL/L (ref 22–29)
CREAT BLD-MCNC: 1.04 MG/DL (ref 0.76–1.27)
DEPRECATED RDW RBC AUTO: 42 FL (ref 37–54)
ERYTHROCYTE [DISTWIDTH] IN BLOOD BY AUTOMATED COUNT: 13.1 % (ref 11.5–14.5)
GFR SERPL CREATININE-BSD FRML MDRD: 72 ML/MIN/1.73
GLUCOSE BLD-MCNC: 148 MG/DL (ref 65–99)
GLUCOSE BLDC GLUCOMTR-MCNC: 149 MG/DL (ref 70–130)
GLUCOSE BLDC GLUCOMTR-MCNC: 168 MG/DL (ref 70–130)
GLUCOSE BLDC GLUCOMTR-MCNC: 171 MG/DL (ref 70–130)
GLUCOSE BLDC GLUCOMTR-MCNC: 173 MG/DL (ref 70–130)
HCT VFR BLD AUTO: 34.7 % (ref 40.4–52.2)
HGB BLD-MCNC: 11.7 G/DL (ref 13.7–17.6)
MCH RBC QN AUTO: 30 PG (ref 27–32.7)
MCHC RBC AUTO-ENTMCNC: 33.7 G/DL (ref 32.6–36.4)
MCV RBC AUTO: 89 FL (ref 79.8–96.2)
PLATELET # BLD AUTO: 163 10*3/MM3 (ref 140–500)
PMV BLD AUTO: 9.9 FL (ref 6–12)
POTASSIUM BLD-SCNC: 4.4 MMOL/L (ref 3.5–5.2)
RBC # BLD AUTO: 3.9 10*6/MM3 (ref 4.6–6)
SODIUM BLD-SCNC: 135 MMOL/L (ref 136–145)
WBC NRBC COR # BLD: 9.49 10*3/MM3 (ref 4.5–10.7)

## 2018-06-01 PROCEDURE — 80048 BASIC METABOLIC PNL TOTAL CA: CPT | Performed by: THORACIC SURGERY (CARDIOTHORACIC VASCULAR SURGERY)

## 2018-06-01 PROCEDURE — 82962 GLUCOSE BLOOD TEST: CPT

## 2018-06-01 PROCEDURE — 97530 THERAPEUTIC ACTIVITIES: CPT

## 2018-06-01 PROCEDURE — 85027 COMPLETE CBC AUTOMATED: CPT | Performed by: THORACIC SURGERY (CARDIOTHORACIC VASCULAR SURGERY)

## 2018-06-01 PROCEDURE — 71046 X-RAY EXAM CHEST 2 VIEWS: CPT

## 2018-06-01 PROCEDURE — 99232 SBSQ HOSP IP/OBS MODERATE 35: CPT | Performed by: INTERNAL MEDICINE

## 2018-06-01 PROCEDURE — 99024 POSTOP FOLLOW-UP VISIT: CPT | Performed by: NURSE PRACTITIONER

## 2018-06-01 PROCEDURE — 97110 THERAPEUTIC EXERCISES: CPT

## 2018-06-01 PROCEDURE — 25010000002 ENOXAPARIN PER 10 MG: Performed by: THORACIC SURGERY (CARDIOTHORACIC VASCULAR SURGERY)

## 2018-06-01 PROCEDURE — 63710000001 INSULIN ASPART PER 5 UNITS: Performed by: THORACIC SURGERY (CARDIOTHORACIC VASCULAR SURGERY)

## 2018-06-01 RX ORDER — LISINOPRIL 5 MG/1
5 TABLET ORAL
Status: DISCONTINUED | OUTPATIENT
Start: 2018-06-01 | End: 2018-06-02 | Stop reason: HOSPADM

## 2018-06-01 RX ORDER — METOPROLOL TARTRATE 50 MG/1
50 TABLET, FILM COATED ORAL EVERY 12 HOURS SCHEDULED
Status: DISCONTINUED | OUTPATIENT
Start: 2018-06-01 | End: 2018-06-02 | Stop reason: HOSPADM

## 2018-06-01 RX ORDER — FUROSEMIDE 40 MG/1
40 TABLET ORAL DAILY
Status: DISCONTINUED | OUTPATIENT
Start: 2018-06-01 | End: 2018-06-02 | Stop reason: HOSPADM

## 2018-06-01 RX ORDER — HYDROCODONE BITARTRATE AND ACETAMINOPHEN 5; 325 MG/1; MG/1
1 TABLET ORAL EVERY 4 HOURS PRN
Qty: 70 TABLET | Refills: 0 | Status: SHIPPED | OUTPATIENT
Start: 2018-06-01 | End: 2018-06-14

## 2018-06-01 RX ADMIN — MUPIROCIN: 20 OINTMENT TOPICAL at 20:40

## 2018-06-01 RX ADMIN — METOPROLOL TARTRATE 12.5 MG: 25 TABLET ORAL at 10:31

## 2018-06-01 RX ADMIN — INSULIN ASPART 3 UNITS: 100 INJECTION, SOLUTION INTRAVENOUS; SUBCUTANEOUS at 12:15

## 2018-06-01 RX ADMIN — METFORMIN HYDROCHLORIDE 500 MG: 500 TABLET ORAL at 17:48

## 2018-06-01 RX ADMIN — GUAIFENESIN 1200 MG: 600 TABLET, EXTENDED RELEASE ORAL at 20:40

## 2018-06-01 RX ADMIN — HYDROCODONE BITARTRATE AND ACETAMINOPHEN 1 TABLET: 5; 325 TABLET ORAL at 20:40

## 2018-06-01 RX ADMIN — HYDROCODONE BITARTRATE AND ACETAMINOPHEN 2 TABLET: 5; 325 TABLET ORAL at 08:19

## 2018-06-01 RX ADMIN — ASPIRIN 81 MG: 81 TABLET ORAL at 08:19

## 2018-06-01 RX ADMIN — METOPROLOL TARTRATE 37.5 MG: 25 TABLET ORAL at 08:19

## 2018-06-01 RX ADMIN — HYDROCODONE BITARTRATE AND ACETAMINOPHEN 1 TABLET: 5; 325 TABLET ORAL at 16:10

## 2018-06-01 RX ADMIN — ENOXAPARIN SODIUM 40 MG: 100 INJECTION SUBCUTANEOUS at 17:22

## 2018-06-01 RX ADMIN — INSULIN ASPART 3 UNITS: 100 INJECTION, SOLUTION INTRAVENOUS; SUBCUTANEOUS at 08:19

## 2018-06-01 RX ADMIN — METOPROLOL TARTRATE 50 MG: 25 TABLET ORAL at 20:40

## 2018-06-01 RX ADMIN — GUAIFENESIN 1200 MG: 600 TABLET, EXTENDED RELEASE ORAL at 08:19

## 2018-06-01 RX ADMIN — LISINOPRIL 5 MG: 5 TABLET ORAL at 12:15

## 2018-06-01 RX ADMIN — FUROSEMIDE 40 MG: 40 TABLET ORAL at 10:31

## 2018-06-01 RX ADMIN — POTASSIUM CHLORIDE 20 MEQ: 750 CAPSULE, EXTENDED RELEASE ORAL at 08:19

## 2018-06-01 RX ADMIN — AMIODARONE HYDROCHLORIDE 400 MG: 200 TABLET ORAL at 08:19

## 2018-06-01 RX ADMIN — HYDROCODONE BITARTRATE AND ACETAMINOPHEN 2 TABLET: 5; 325 TABLET ORAL at 03:50

## 2018-06-01 RX ADMIN — CHLORHEXIDINE GLUCONATE 15 ML: 1.2 RINSE ORAL at 06:14

## 2018-06-01 RX ADMIN — PANTOPRAZOLE SODIUM 40 MG: 40 TABLET, DELAYED RELEASE ORAL at 08:19

## 2018-06-01 RX ADMIN — MUPIROCIN: 20 OINTMENT TOPICAL at 08:20

## 2018-06-01 RX ADMIN — INSULIN ASPART 3 UNITS: 100 INJECTION, SOLUTION INTRAVENOUS; SUBCUTANEOUS at 20:40

## 2018-06-01 NOTE — SIGNIFICANT NOTE
06/01/18 [facility]   Rehab Treatment   Discipline physical therapist   Reason Treatment Not Performed unavailable for treatment  (Pt off the floor to x-ray in AM. PT will try to check back a later time.)   Recommendation   PT - Next Appointment 06/01/18

## 2018-06-01 NOTE — PROGRESS NOTES
Hospital Follow Up        Chief Complaint: Follow up CAD status post CABG    Interval History: Feels well today. No complaints.     Objective:     Objective:  Temp:  [97.9 °F (36.6 °C)-98.6 °F (37 °C)] 97.9 °F (36.6 °C)  Heart Rate:  [73-94] 87  Resp:  [16-18] 16  BP: (117-142)/(64-72) 135/68     Intake/Output Summary (Last 24 hours) at 06/01/18 0748  Last data filed at 06/01/18 0500   Gross per 24 hour   Intake              720 ml   Output             2100 ml   Net            -1380 ml     Body mass index is 36.73 kg/m².  1    05/31/18  0505 06/01/18  0445 06/01/18  0500   Weight: 117 kg (259 lb) 116 kg (256 lb) 116 kg (256 lb)     Weight change: -1.361 kg (-3 lb)      Physical Exam:   General : Alert, cooperative, in no acute distress.  Neuro: alert,cooperative and oriented  Lungs: CTAB. Normal respiratory effort and rate.  CV:: Regular rate and rhythm, normal S1 and S2, no murmurs, gallops or rubs.  ABD: Soft, nontender, non-distended. positive bowel sounds  Extr: No edema or cyanosis, moves all extremities    Lab Review:     Results from last 7 days  Lab Units 06/01/18  0402 05/31/18  0344   SODIUM mmol/L 135* 138   POTASSIUM mmol/L 4.4 5.1   CHLORIDE mmol/L 98 104   CO2 mmol/L 22.6 20.1*   BUN mg/dL 20 17   CREATININE mg/dL 1.04 1.03   GLUCOSE mg/dL 148* 165*   CALCIUM mg/dL 9.0 8.5*           Results from last 7 days  Lab Units 06/01/18  0402 05/31/18  0719   WBC 10*3/mm3 9.49 10.13   HEMOGLOBIN g/dL 11.7* 11.5*   HEMATOCRIT % 34.7* 34.6*   PLATELETS 10*3/mm3 163 127*       Results from last 7 days  Lab Units 05/30/18  0304 05/29/18  1644   INR  1.24* 1.37*   APTT seconds  --  32.7       Results from last 7 days  Lab Units 05/30/18  0304 05/29/18  2035   MAGNESIUM mg/dL 2.5* 2.4           Invalid input(s): LDLCALC          I reviewed the patient's new clinical results.  I personally viewed and interpreted the patient's EKG  Current Medications:   Scheduled Meds:  amiodarone 400 mg Oral Q12H   aspirin 81 mg  Oral Daily   chlorhexidine 15 mL Mouth/Throat Q12H   enoxaparin 40 mg Subcutaneous Daily   guaiFENesin 1,200 mg Oral Q12H   insulin aspart 0-14 Units Subcutaneous 4x Daily With Meals & Nightly   metoprolol tartrate 37.5 mg Oral Q12H   mupirocin  Each Nare BID   pantoprazole 40 mg Oral Daily   polyethylene glycol 17 g Oral Daily   potassium chloride 20 mEq Oral Daily   sennosides-docusate sodium 2 tablet Oral Nightly     Continuous Infusions:  sodium chloride 30 mL/hr   sodium chloride 30 mL/hr       Allergies:  Allergies   Allergen Reactions   • Statins Myalgia     FATIGUE, CANNOT SLEEP       Assessment/Plan:     1. Multivessel coronary artery disease s/p CABG. With LIMA to LAD , SVG to OM1, and SVG to second diagonal branch on 5/29.  Line/tube management per CT surgery. Hopefully some come out today. IV lasix given yesterday.  Normal preop LV systolic function.   2. Carotid artery disease. mild CARRI stenosis  3. DM2. on sliding scale.   4. HLD. allergy to statins  5. HTN. beta blocker has been titrated   6. VANDANA compliant with cpap    -  No arrhythmias noted.  Will discontinue amiodarone  -  Agree with oral furosemide  -  Ok for discharge from my standpoint.  Follow up with Dr. Ambriz in 1 month        Rekha Salinas MD  06/01/18  7:48 AM

## 2018-06-01 NOTE — THERAPY TREATMENT NOTE
Acute Care - Occupational Therapy Treatment Note   [facility]     Patient Name: Mark Manzo  : 1955  MRN: [id number]  Today's Date: 2018  Onset of Illness/Injury or Date of Surgery: 18  Date of Referral to OT: 18  Referring Physician: Shannon    Admit Date: 2018       ICD-10-CM ICD-9-CM   1. Generalized weakness R53.1 780.79   2. ASHD (arteriosclerotic heart disease) I25.10 414.00   3. Coronary artery disease involving native heart without angina pectoris, unspecified vessel or lesion type I25.10 414.01     Patient Active Problem List   Diagnosis   • Preop cardiovascular exam   • Essential hypertension   • Hyperlipidemia   • Type 2 diabetes mellitus without complication, with long-term current use of insulin   • VANDANA (obstructive sleep apnea)   • Class 2 obesity due to excess calories with serious comorbidity and body mass index (BMI) of 37.0 to 37.9 in adult   • Abnormal stress echo   • ASHD (arteriosclerotic heart disease)   • Coronary artery disease involving native heart without angina pectoris     Past Medical History:   Diagnosis Date   • Acute pain of right knee     H/O   • Arthritis    • Body aches    • Chronic left hip pain    • Coronary artery disease    • Decreased energy    • Depression     IN PAST   • Diabetes mellitus     TYPE 2   • Fatigue    • History of allergic rhinitis    • History of left hip replacement    • Hyperlipidemia    • Hypertension    • Joint pain    • Sleep apnea     USUS C-PAP   • Swelling    • Weight gain      Past Surgical History:   Procedure Laterality Date   • CARDIAC CATHETERIZATION N/A 2018    Procedure: Coronary angiography;  Surgeon: Charles Sarmiento MD;  Location: [facility] INVASIVE LOCATION;  Service: Cardiovascular   • CARDIAC CATHETERIZATION N/A 2018    Procedure: Left Heart Cath;  Surgeon: Charles Sarmiento MD;  Location: [facility] CATH INVASIVE LOCATION;  Service: Cardiovascular   • CARDIAC CATHETERIZATION N/A 2018    Procedure:  Left ventriculography;  Surgeon: Charles Sarmiento MD;  Location: [facility] INVASIVE LOCATION;  Service: Cardiovascular   • COLONOSCOPY     • CORONARY ARTERY BYPASS GRAFT N/A 5/29/2018    Procedure: SAGE STERNOTOMY CORONARY ARTERY BYPASS GRAFT TIMES 3 USING LEFT INTERNAL MAMMARY ARTERY AND LEFT GREATER SAPHENOUS VEIN GRAFT PER ENDOSCOPIC VEIN HARVESTING AND PRP;  Surgeon: Junior Ortega MD;  Location: [facility] OR;  Service: Cardiothoracic   • OTHER SURGICAL HISTORY      Rectal Surgery Of Perirectal Fistula   • TONSILLECTOMY     • TOTAL HIP ARTHROPLASTY Left    • VASECTOMY         Therapy Treatment          Rehabilitation Treatment Summary     Row Name 06/01/18 1055             Treatment Time/Intention    Discipline occupational therapist  -RP      Document Type therapy note (daily note)  -RP      Subjective Information no complaints  -RP      Mode of Treatment occupational therapy  -RP      Patient/Family Observations Pt in recliner chair upon OT arrival- stated he just finished a sponge bath and toileting tasks; agreeable to therapy  -RP      Care Plan Review evaluation/treatment results reviewed;care plan/treatment goals reviewed;patient/other agree to care plan  -RP      Patient Effort good  -RP      Recorded by [RP] Cristela Flores OT 06/01/18 1259      Row Name 06/01/18 1055             Cognitive Assessment/Intervention- PT/OT    Orientation Status (Cognition) oriented x 4  -RP      Follows Commands (Cognition) WNL  -RP      Recorded by [RP] Cristela Flores OT 06/01/18 1259      Row Name 06/01/18 1055             Safety Issues, Functional Mobility    Comment, Safety Issues/Impairments (Mobility) Pt ambulated approx. 5 ft forward/back w/ CGA  -RP      Recorded by [RP] Cristela Flores OT 06/01/18 1259      Row Name 06/01/18 1055             Transfer Assessment/Treatment    Transfer Assessment/Treatment sit-stand transfer;stand-sit transfer  -RP      Comment (Transfers) Pt engaged in multiple sit <> stand  transfers w/ increased Ind noted on 2nd and 3rd trials  -RP      Sit-Stand [place] (Transfers) minimum assist (75% patient effort);contact guard;verbal cues  -RP      Stand-Sit [place] (Transfers) supervision;contact guard;verbal cues  -RP      Recorded by [RP] Cristela Flores OT 06/01/18 1259      Row Name 06/01/18 1055             Therapeutic Exercise    Therapeutic Exercise seated, upper extremities;seated, lower extremities  -RP      Recorded by [RP] Cristela Flores, OT 06/01/18 1259      Row Name 06/01/18 1055             Upper Extremity Seated Therapeutic Exercise    Performed, Seated Upper Extremity (Therapeutic Exercise) shoulder flexion/extension;scapular protraction/retraction;elbow flexion/extension  -RP      Exercise Type, Seated Upper Extremity (Therapeutic Exercise) AROM (active range of motion)  -RP      Expected Outcomes, Seated Upper Extremity (Therapeutic Exercise) improve functional tolerance, self-care activity;improve performance, BADLs  -RP      Sets/Reps Detail, Seated Upper Extremity (Therapeutic Exercise) 10 reps  -RP      Recorded by [RP] Cristela Flores OT 06/01/18 1259      Row Name 06/01/18 1055             Lower Extremity Seated Therapeutic Exercise    Performed, Seated Lower Extremity (Therapeutic Exercise) hip flexion/extension;knee flexion/extension;ankle dorsiflexion/plantarflexion  -RP      Exercise Type, Seated Lower Extremity (Therapeutic Exercise) AROM (active range of motion)  -RP      Expected Outcomes, Seated Lower Extremity (Therapeutic Exercise) improve functional tolerance, self-care activity;improve performance, BADLs;improve performance, transfer skills  -RP      Sets/Reps Detail, Seated Lower Extremity (Therapeutic Exercise) 10 reps  -RP      Recorded by [RP] Cristela Flores, OT 06/01/18 1259      Row Name 06/01/18 1055             Balance    Balance static standing balance  -RP      Recorded by [RP] Cristela Flores, OT 06/01/18 1259      Row Name 06/01/18 1055              Static Standing Balance    Level of [place] (Supported Standing, Static Balance) supervision  -RP      Time Able to Maintain Position (Supported Standing, Static Balance) 4 to 5 minutes  -RP      Comment (Supported Standing, Static Balance) Pt demonstrated 5 min of standing tolerance- no AD  -RP      Recorded by [RP] Cristela Flores, OT 06/01/18 1259      Row Name 06/01/18 1055             Positioning and Restraints    Pre-Treatment Position sitting in chair/recliner  -RP      Post Treatment Position chair  -RP      In Chair sitting;call light within reach;encouraged to call for assist;with family/caregiver  -RP      Recorded by [RP] Cristela Flores, OT 06/01/18 1259      Row Name 06/01/18 1055             Pain Assessment    Additional Documentation Pain Scale: Numbers Pre/Post-Treatment (Group)  -RP      Recorded by [RP] Cristela Flores, OT 06/01/18 1259      Row Name 06/01/18 1055             Pain Scale: Numbers Pre/Post-Treatment    Pain Scale: Numbers, Pretreatment 0/10 - no pain  -RP      Pain Scale: Numbers, Post-Treatment 0/10 - no pain  -RP      Recorded by [RP] Cristela Flores, OT 06/01/18 1259      Row Name                Wound 05/29/18 1549 Other (See comments) chest incision    Wound - Properties Group Date first assessed: 05/29/18 [AB] Time first assessed: 1549 [AB] Side: Other (See comments) [AB] Location: chest [AB] Type: incision [AB] Recorded by:  [AB] Nehemias Mann RN 05/29/18 1549    Row Name                Wound 05/29/18 1549 Right leg incision    Wound - Properties Group Date first assessed: 05/29/18 [AB] Time first assessed: 1549 [AB] Side: Right [AB] Location: leg [AB] Type: incision [AB] Recorded by:  [AB] Nehemias Mann RN 05/29/18 1549    Row Name                Wound 05/29/18 1549 Left leg incision    Wound - Properties Group Date first assessed: 05/29/18 [AB] Time first assessed: 1549 [AB] Side: Left [AB] Location: leg [AB] Type: incision [AB] Recorded by:  [AB]  Nehemias Mann RN 05/29/18 1549    Row Name                Wound 05/29/18 1549 Right groin incision    Wound - Properties Group Date first assessed: 05/29/18 [AB] Time first assessed: 1549 [AB] Side: Right [AB] Location: groin [AB] Type: incision [AB] Recorded by:  [AB] Nehemias Mann RN 05/29/18 1549    Row Name 06/01/18 1055             Coping    Observed Emotional State accepting;calm;cooperative  -RP      Verbalized Emotional State acceptance  -RP      Recorded by [RP] Cristela Flores, OT 06/01/18 1259      Row Name 06/01/18 1055             Plan of Care Review    Plan of Care Reviewed With patient  -RP      Recorded by [RP] Cristela Flores, OT 06/01/18 1259        User Key  (r) = Recorded By, (t) = Taken By, (c) = Cosigned By    Initials Name Effective Dates Discipline    AB Nehemias Mann RN 01/05/18 -  Nurse    RP Cristela Flores OT 05/03/18 -  OT        Wound 05/29/18 1549 Other (See comments) chest incision (Active)   Dressing Appearance dry;intact 6/1/2018 12:20 PM   Closure EVELIO 6/1/2018  4:05 AM   Drainage Amount none 6/1/2018 12:20 PM   Dressing Care, Wound foam;low-adherent 6/1/2018  8:19 AM       Wound 05/29/18 1549 Right leg incision (Active)   Dressing Appearance dry;intact 6/1/2018 12:20 PM   Closure EVELIO 6/1/2018  4:05 AM   Drainage Amount none 6/1/2018  4:05 AM   Dressing Care, Wound gauze 5/31/2018  7:40 PM       Wound 05/29/18 1549 Left leg incision (Active)   Dressing Appearance dry;intact 6/1/2018 12:20 PM   Closure EVELIO 6/1/2018  4:05 AM   Drainage Amount none 6/1/2018  4:05 AM   Dressing Care, Wound gauze;transparent film 6/1/2018  8:19 AM       Wound 05/29/18 1549 Right groin incision (Active)   Dressing Appearance dry;intact 6/1/2018 12:20 PM   Drainage Amount none 6/1/2018  4:05 AM   Dressing Care, Wound gauze;transparent film 6/1/2018  8:19 AM         Occupational Therapy Education     Title: PT OT SLP Therapies (Active)     Topic: Occupational Therapy (Done)     Point: ADL  training (Done)     Description: Instruct learner(s) on proper safety adaptation and remediation techniques during self care or transfers.   Instruct in proper use of assistive devices.   Learning Progress Summary     Learner Status Readiness Method Response Comment Documented by    Patient Done Acceptance LORRAINE,YASHIRA JACOBS,TONYA ed pt and family on role of OT, benefit of therapy, POC w. OT. ed on safety w. ADLs. pt verbally understands and demo UE ex  05/30/18 1545    Family Done Acceptance YASHIRA PETERS DU ed pt and family on role of OT, benefit of therapy, POC w. OT. ed on safety w. ADLs. pt verbally understands and demo UE ex  05/30/18 1545          Point: Precautions (Done)     Description: Instruct learner(s) on prescribed precautions during self-care and functional transfers.   Learning Progress Summary     Learner Status Readiness Method Response Comment Documented by    Patient Done Acceptance YASHIRA PETERS DU ed pt and family on role of OT, benefit of therapy, POC w. OT. ed on safety w. ADLs. pt verbally understands and demo UE ex  05/30/18 1545    Family Done Acceptance YASHIRA PETERS DU ed pt and family on role of OT, benefit of therapy, POC w. OT. ed on safety w. ADLs. pt verbally understands and demo UE ex  05/30/18 1545          Point: Body mechanics (Done)     Description: Instruct learner(s) on proper positioning and spine alignment during self-care, functional mobility activities and/or exercises.   Learning Progress Summary     Learner Status Readiness Method Response Comment Documented by    Patient Done Acceptance YASHIRA PETERS DU ed pt and family on role of OT, benefit of therapy, POC w. OT. ed on safety w. ADLs. pt verbally understands and demo UE ex  05/30/18 1545    Family Done Acceptance YASHIRA PETERS DU ed pt and family on role of OT, benefit of therapy, POC w. OT. ed on safety w. ADLs. pt verbally understands and demo UE ex  05/30/18 1545                      User Key     Initials Effective Dates Name Provider Type Discipline      04/13/15 -  Ginny Garcia OTR Occupational Therapist OT                OT Recommendation and Plan     Plan of Care Review  Plan of Care Reviewed With: patient  Plan of Care Reviewed With: patient  Outcome Summary: Pt participated in OT treatment- pt practiced multiple sit <> stand transfers from recliner chair- pt req min A - CGA w/ increased Ind noted w/ each trial; pt engaged in B UE/LE thera exercises        Outcome Measures     Row Name 06/01/18 1300 05/31/18 1600 05/31/18 1000       How much help from another person do you currently need...    Turning from your back to your side while in flat bed without using bedrails?  --  -- 3  -EM    Moving from lying on back to sitting on the side of a flat bed without bedrails?  --  -- 3  -EM    Moving to and from a bed to a chair (including a wheelchair)?  --  -- 3  -EM    Standing up from a chair using your arms (e.g., wheelchair, bedside chair)?  --  -- 3  -EM    Climbing 3-5 steps with a railing?  --  -- 3  -EM    To walk in hospital room?  --  -- 3  -EM    AM-PAC 6 Clicks Score  --  -- 18  -EM       How much help from another is currently needed...    Putting on and taking off regular lower body clothing? 2  -RP 2  -SO  --    Bathing (including washing, rinsing, and drying) 2  -RP 2  -SO  --    Toileting (which includes using toilet bed pan or urinal) 3  -RP 3  -SO  --    Putting on and taking off regular upper body clothing 3  -RP 3  -SO  --    Taking care of personal grooming (such as brushing teeth) 3  -RP 3  -SO  --    Eating meals 4  -RP 4  -SO  --    Score 17  -RP 17  -SO  --       Functional Assessment    Outcome Measure Options AM-PAC 6 Clicks Daily Activity (OT)  -RP AM-PAC 6 Clicks Daily Activity (OT)  -SO  --    Row Name 05/30/18 1548 05/30/18 0900          How much help from another person do you currently need...    Turning from your back to your side while in flat bed without using bedrails?  -- 3  -EM     Moving from lying on back to  sitting on the side of a flat bed without bedrails?  -- 2  -EM     Moving to and from a bed to a chair (including a wheelchair)?  -- 3  -EM     Standing up from a chair using your arms (e.g., wheelchair, bedside chair)?  -- 3  -EM     Climbing 3-5 steps with a railing?  -- 2  -EM     To walk in hospital room?  -- 3  -EM     AM-PAC 6 Clicks Score  -- 16  -EM        How much help from another is currently needed...    Putting on and taking off regular lower body clothing? 2  -KP  --     Bathing (including washing, rinsing, and drying) 2  -KP  --     Toileting (which includes using toilet bed pan or urinal) 3  -KP  --     Putting on and taking off regular upper body clothing 3  -KP  --     Taking care of personal grooming (such as brushing teeth) 3  -KP  --     Eating meals 4  -KP  --     Score 17  -KP  --        Functional Assessment    Outcome Measure Options AM-PAC 6 Clicks Daily Activity (OT)  -KP AM-PAC 6 Clicks Basic Mobility (PT)  -EM       User Key  (r) = Recorded By, (t) = Taken By, (c) = Cosigned By    Initials Name Provider Type    SO Joan Anderson, OTR Occupational Therapist    KP Ginny Garcia, OTR Occupational Therapist    EM Parvin Rincon, PT Physical Therapist    RP Cristela Flores OT Occupational Therapist           Time Calculation:         Time Calculation- OT     Row Name 06/01/18 1303             Time Calculation- OT    OT Start Time 1032  -RP      OT Stop Time 1055  -RP      OT Time Calculation (min) 23 min  -RP      OT Received On 06/01/18  -RP        User Key  (r) = Recorded By, (t) = Taken By, (c) = Cosigned By    Initials Name Provider Type    RP Cristela Flores OT Occupational Therapist           Therapy Charges for Today     Code Description Service Date Service Provider Modifiers Qty    11917775215 HC OT THERAPEUTIC ACT EA 15 MIN 6/1/2018 Cristela Flores OT GO 1    79678848306 HC OT THER PROC EA 15 MIN 6/1/2018 Cristela Flores OT GO 1               Cristela Flores  OT  6/1/2018

## 2018-06-01 NOTE — PLAN OF CARE
Problem: Patient Care Overview  Goal: Plan of Care Review  Outcome: Ongoing (interventions implemented as appropriate)   06/01/18 1302   OTHER   Outcome Summary Pt participated in OT treatment- pt practiced multiple sit <> stand transfers from recliner chair- pt req min A - CGA w/ increased Ind noted w/ each trial; pt engaged in B UE/LE thera exercises   Coping/Psychosocial   Plan of Care Reviewed With patient   Plan of Care Review   Progress improving

## 2018-06-01 NOTE — PROGRESS NOTES
Continued Stay Note  [facility]     Patient Name: Mark Manzo  MRN: [id number]  Today's Date: 6/1/2018    Admit Date: 5/29/2018          Discharge Plan     Row Name 06/01/18 1607       Plan    Plan Home with [facility]    Patient/Family in Agreement with Plan yes    Plan Comments PER angelique Ordaz for [facility] they are unable to accept Pt insurance at this time.  Pt has now chosen [facility] to follow him at home.  Pt significant other will be providing assistance for Pt at his apartment once he is discharged.  Referral called to angelique Madison for [facility].  RAYMOND JIMÉNEZ RN/CCP    Row Name 06/01/18 1526       Plan    Plan Home with [facility]    Patient/Family in Agreement with Plan yes    Plan Comments Pt has chosen [facility] from list provided to him.  Referral called to angelique Ordaz at 3:26 PM.  Pt s.o. will transport him home               Discharge Codes    No documentation.           Leonor Jiménez RN

## 2018-06-01 NOTE — THERAPY DISCHARGE NOTE
Acute Care - Physical Therapy Treatment Note/Discharge  [facility]     Patient Name: Mark Manzo  : 1955  MRN: [id number]  Today's Date: 2018  Onset of Illness/Injury or Date of Surgery: 18     Referring Physician: Shannon    Admit Date: 2018    Visit Dx:    ICD-10-CM ICD-9-CM   1. Generalized weakness R53.1 780.79   2. ASHD (arteriosclerotic heart disease) I25.10 414.00   3. Coronary artery disease involving native heart without angina pectoris, unspecified vessel or lesion type I25.10 414.01     Patient Active Problem List   Diagnosis   • Preop cardiovascular exam   • Essential hypertension   • Hyperlipidemia   • Type 2 diabetes mellitus without complication, with long-term current use of insulin   • VANDANA (obstructive sleep apnea)   • Class 2 obesity due to excess calories with serious comorbidity and body mass index (BMI) of 37.0 to 37.9 in adult   • Abnormal stress echo   • ASHD (arteriosclerotic heart disease)   • Coronary artery disease involving native heart without angina pectoris       Physical Therapy Education     Title: PT OT SLP Therapies (Done)     Topic: Physical Therapy (Done)     Point: Mobility training (Done)    Learning Progress Summary     Learner Status Readiness Method Response Comment Documented by    Patient Done Acceptance ELLEN PETERS D VU, DU   18 1542     Done Acceptance E VU  EM 18 0959     Active Acceptance E NR   18 0950          Point: Home exercise program (Done)    Learning Progress Summary     Learner Status Readiness Method Response Comment Documented by    Patient Done Acceptance ELLEN PETERS D VU, DU   18 1542     Done Acceptance E VU  EM 18 0959     Active Acceptance E NR  EM 18 0950          Point: Body mechanics (Done)    Learning Progress Summary     Learner Status Readiness Method Response Comment Documented by    Patient Done Acceptance ELLEN PETERS D VU, DU   18 1542          Point: Precautions (Done)    Learning Progress  Summary     Learner Status Readiness Method Response Comment Documented by    Patient Done Acceptance E,TB,D REYNALDO,TONYA   06/01/18 1542                      User Key     Initials Effective Dates Name Provider Type Discipline     04/03/18 -  Lexie Da Silva, PT Physical Therapist PT    EM 04/03/18 -  Parvin Rincon, PT Physical Therapist PT                    PT Rehab Goals     Row Name 06/01/18 1500             Problem Specific Goal 1 (PT)    Progress/Outcome (Problem Specific Goal 1, PT) goal met  -        User Key  (r) = Recorded By, (t) = Taken By, (c) = Cosigned By    Initials Name Provider Type Discipline     Lexie Da Silva, PT Physical Therapist PT        Therapy Treatment        Rehabilitation Treatment Summary     Row Name 06/01/18 1535 06/01/18 1055          Treatment Time/Intention    Discipline physical therapist  -CH occupational therapist  -RP     Document Type discharge treatment  - therapy note (daily note)  -RP     Subjective Information no complaints  - no complaints  -RP     Mode of Treatment physical therapy  - occupational therapy  -RP     Patient/Family Observations pt sitting in chair, family present, no acute distress noted at rest  - Pt in recliner chair upon OT arrival- stated he just finished a sponge bath and toileting tasks; agreeable to therapy  -RP     Care Plan Review  -- evaluation/treatment results reviewed;care plan/treatment goals reviewed;patient/other agree to care plan  -RP     Patient Effort good  - good  -RP     Existing Precautions/Restrictions cardiac;sternal  -  --     Recorded by [CH] Lexie Da Silva, PT 06/01/18 1542 [RP] Cristela Flores, OT 06/01/18 1259     Row Name 06/01/18 1535             Cognitive Assessment/Intervention    Additional Documentation Cognitive Assessment/Intervention (Group)  -      Recorded by [CH] Lexie Da Silva, PT 06/01/18 1542      Row Name 06/01/18 1535 06/01/18 1055          Cognitive Assessment/Intervention-  PT/OT    Orientation Status (Cognition) oriented x 4  -CH oriented x 4  -RP     Follows Commands (Cognition) WFL  -CH WNL  -RP     Cognitive Function (Cognitive) WFL  -CH  --     Personal Safety Interventions fall prevention program maintained;nonskid shoes/slippers when out of bed  -CH  --     Recorded by [CH] Lexie Da Silva, PT 06/01/18 1542 [RP] Cristela Flores, OT 06/01/18 1259     Row Name 06/01/18 1055             Safety Issues, Functional Mobility    Comment, Safety Issues/Impairments (Mobility) Pt ambulated approx. 5 ft forward/back w/ CGA  -RP      Recorded by [RP] Cristela Flores, OT 06/01/18 1259      Row Name 06/01/18 1535             Bed Mobility Assessment/Treatment    Supine-Sit [place] (Bed Mobility) not tested  -CH      Sit-Supine [place] (Bed Mobility) not tested  -CH      Comment (Bed Mobility) sitting in chair  -CH      Recorded by [CH] Lexie Da Silva, PT 06/01/18 1542      Row Name 06/01/18 1535 06/01/18 1055          Transfer Assessment/Treatment    Transfer Assessment/Treatment  -- sit-stand transfer;stand-sit transfer  -RP     Comment (Transfers)  -- Pt engaged in multiple sit <> stand transfers w/ increased Ind noted on 2nd and 3rd trials  -RP     Sit-Stand [place] (Transfers) supervision  - minimum assist (75% patient effort);contact guard;verbal cues  -RP     Stand-Sit [place] (Transfers) supervision  -CH supervision;contact guard;verbal cues  -RP     Recorded by [CH] Lexie Da Silva, PT 06/01/18 1542 [RP] Cristela Flores, OT 06/01/18 1259     Row Name 06/01/18 1535             Gait/Stairs Assessment/Training    Gait/Stairs Assessment/Training gait/ambulation independence  -CH      [place] Level (Gait) supervision  -CH      Distance in Feet (Gait) 300  -CH      [place] Level (Stairs) stand by assist  -      Handrail Location (Stairs) both sides  -CH      Number of Steps (Stairs) 8  -CH      Recorded by [CH] Lexie Da Silva, PT 06/01/18 1542      Row  Name 06/01/18 1535 06/01/18 1055          Therapeutic Exercise    Therapeutic Exercise --   10 reps cardiac protocol, level 5  -CH seated, upper extremities;seated, lower extremities  -RP     Recorded by [CH] Lexie Da Silva, PT 06/01/18 1542 [RP] Cristela Flores, OT 06/01/18 1259     Row Name 06/01/18 1055             Upper Extremity Seated Therapeutic Exercise    Performed, Seated Upper Extremity (Therapeutic Exercise) shoulder flexion/extension;scapular protraction/retraction;elbow flexion/extension  -RP      Exercise Type, Seated Upper Extremity (Therapeutic Exercise) AROM (active range of motion)  -RP      Expected Outcomes, Seated Upper Extremity (Therapeutic Exercise) improve functional tolerance, self-care activity;improve performance, BADLs  -RP      Sets/Reps Detail, Seated Upper Extremity (Therapeutic Exercise) 10 reps  -RP      Recorded by [RP] Cristela Flores, CHAMP 06/01/18 1259      Row Name 06/01/18 1055             Lower Extremity Seated Therapeutic Exercise    Performed, Seated Lower Extremity (Therapeutic Exercise) hip flexion/extension;knee flexion/extension;ankle dorsiflexion/plantarflexion  -RP      Exercise Type, Seated Lower Extremity (Therapeutic Exercise) AROM (active range of motion)  -RP      Expected Outcomes, Seated Lower Extremity (Therapeutic Exercise) improve functional tolerance, self-care activity;improve performance, BADLs;improve performance, transfer skills  -RP      Sets/Reps Detail, Seated Lower Extremity (Therapeutic Exercise) 10 reps  -RP      Recorded by [RP] Cristela Flores, OT 06/01/18 1259      Row Name 06/01/18 1055             Balance    Balance static standing balance  -RP      Recorded by [RP] Cristela Flores OT 06/01/18 1259      Row Name 06/01/18 1055             Static Standing Balance    Level of [place] (Supported Standing, Static Balance) supervision  -RP      Time Able to Maintain Position (Supported Standing, Static Balance) 4 to 5 minutes  -RP      Comment  (Supported Standing, Static Balance) Pt demonstrated 5 min of standing tolerance- no AD  -RP      Recorded by [RP] Cristela Flores, OT 06/01/18 1259      Row Name 06/01/18 1535 06/01/18 1055          Positioning and Restraints    Pre-Treatment Position sitting in chair/recliner  -CH sitting in chair/recliner  -RP     Post Treatment Position chair  -CH chair  -RP     In Chair sitting;call light within reach;encouraged to call for assist;with family/caregiver  -CH sitting;call light within reach;encouraged to call for assist;with family/caregiver  -RP     Recorded by [CH] Lexie Da Silva, PT 06/01/18 1542 [RP] Cristela Flores, OT 06/01/18 1259     Row Name 06/01/18 1055             Pain Assessment    Additional Documentation Pain Scale: Numbers Pre/Post-Treatment (Group)  -RP      Recorded by [RP] Cristela Flores, OT 06/01/18 1259      Row Name 06/01/18 1535 06/01/18 1055          Pain Scale: Numbers Pre/Post-Treatment    Pain Scale: Numbers, Pretreatment 0/10 - no pain  -CH 0/10 - no pain  -RP     Pain Scale: Numbers, Post-Treatment  -- 0/10 - no pain  -RP     Recorded by [CH] Lexie Da Silva, PT 06/01/18 1542 [RP] Cristela Flores, OT 06/01/18 1259     Row Name                Wound 05/29/18 1549 Other (See comments) chest incision    Wound - Properties Group Date first assessed: 05/29/18 [AB] Time first assessed: 1549 [AB] Side: Other (See comments) [AB] Location: chest [AB] Type: incision [AB] Recorded by:  [AB] Nehemias Mann RN 05/29/18 1549    Row Name                Wound 05/29/18 1549 Right leg incision    Wound - Properties Group Date first assessed: 05/29/18 [AB] Time first assessed: 1549 [AB] Side: Right [AB] Location: leg [AB] Type: incision [AB] Recorded by:  [AB] Nehemias Mann RN 05/29/18 1549    Row Name                Wound 05/29/18 1549 Left leg incision    Wound - Properties Group Date first assessed: 05/29/18 [AB] Time first assessed: 1549 [AB] Side: Left [AB] Location: leg [AB] Type:  incision [AB] Recorded by:  [AB] Nehemias Mann RN 05/29/18 1549    Row Name                Wound 05/29/18 1549 Right groin incision    Wound - Properties Group Date first assessed: 05/29/18 [AB] Time first assessed: 1549 [AB] Side: Right [AB] Location: groin [AB] Type: incision [AB] Recorded by:  [AB] Nehemias Mann RN 05/29/18 1549    Row Name 06/01/18 1055             Coping    Observed Emotional State accepting;calm;cooperative  -RP      Verbalized Emotional State acceptance  -RP      Recorded by [RP] Cristela Flores, OT 06/01/18 1259      Row Name 06/01/18 1535 06/01/18 1055          Plan of Care Review    Plan of Care Reviewed With patient  -CH patient  -RP     Recorded by [CH] Lexie Da Silva, PT 06/01/18 1542 [RP] Cristela Flores, OT 06/01/18 1259     Row Name 06/01/18 1535             Outcome Summary/Treatment Plan (PT)    Anticipated Discharge Disposition (PT) home  -      Recorded by [CH] Lexie Da Silva, PT 06/01/18 1542        User Key  (r) = Recorded By, (t) = Taken By, (c) = Cosigned By    Initials Name Effective Dates Discipline    CH Lexie Da Silva, PT 04/03/18 -  PT    AB Nehemias Mann RN 01/05/18 -  Nurse    RP Cristela Flores, OT 05/03/18 -  OT        Wound 05/29/18 1549 Other (See comments) chest incision (Active)   Dressing Appearance dry;intact 6/1/2018 12:20 PM   Closure EVELIO 6/1/2018  4:05 AM   Drainage Amount none 6/1/2018 12:20 PM   Dressing Care, Wound foam;low-adherent 6/1/2018  8:19 AM       Wound 05/29/18 1549 Right leg incision (Active)   Dressing Appearance dry;intact 6/1/2018 12:20 PM   Closure EVELIO 6/1/2018  4:05 AM   Drainage Amount none 6/1/2018  4:05 AM   Dressing Care, Wound gauze 5/31/2018  7:40 PM       Wound 05/29/18 1549 Left leg incision (Active)   Dressing Appearance dry;intact 6/1/2018 12:20 PM   Closure EVELIO 6/1/2018  4:05 AM   Drainage Amount none 6/1/2018  4:05 AM   Dressing Care, Wound gauze;transparent film 6/1/2018  8:19 AM       Wound 05/29/18  1549 Right groin incision (Active)   Dressing Appearance dry;intact 6/1/2018 12:20 PM   Drainage Amount none 6/1/2018  4:05 AM   Dressing Care, Wound gauze;transparent film 6/1/2018  8:19 AM       PT Recommendation and Plan  Anticipated Discharge Disposition (PT): home  Outcome Summary/Treatment Plan (PT)  Anticipated Discharge Disposition (PT): home  Plan of Care Reviewed With: patient  Outcome Summary: Pt demonstrates adequate strength and balance to perform functional mobility and gait independently. Pt abl to navigate stairs safely and has acheived his PT goals. PT will sign off at this time. Pt may go home this PM.          Outcome Measures     Row Name 06/01/18 1500 06/01/18 1300 05/31/18 1600       How much help from another person do you currently need...    Turning from your back to your side while in flat bed without using bedrails? 4  -CH  --  --    Moving from lying on back to sitting on the side of a flat bed without bedrails? 4  -CH  --  --    Moving to and from a bed to a chair (including a wheelchair)? 4  -CH  --  --    Standing up from a chair using your arms (e.g., wheelchair, bedside chair)? 4  -CH  --  --    Climbing 3-5 steps with a railing? 4  -CH  --  --    To walk in hospital room? 4  -CH  --  --    AM-PAC 6 Clicks Score 24  -CH  --  --       How much help from another is currently needed...    Putting on and taking off regular lower body clothing?  -- 2  -RP 2  -SO    Bathing (including washing, rinsing, and drying)  -- 2  -RP 2  -SO    Toileting (which includes using toilet bed pan or urinal)  -- 3  -RP 3  -SO    Putting on and taking off regular upper body clothing  -- 3  -RP 3  -SO    Taking care of personal grooming (such as brushing teeth)  -- 3  -RP 3  -SO    Eating meals  -- 4  -RP 4  -SO    Score  -- 17  -RP 17  -SO       Functional Assessment    Outcome Measure Options AM-PAC 6 Clicks Basic Mobility (PT)  -CH AM-PAC 6 Clicks Daily Activity (OT)  -RP AM-PAC 6 Clicks Daily Activity  (OT)  -SO    Row Name 05/31/18 1000 05/30/18 1548 05/30/18 0900       How much help from another person do you currently need...    Turning from your back to your side while in flat bed without using bedrails? 3  -EM  -- 3  -EM    Moving from lying on back to sitting on the side of a flat bed without bedrails? 3  -EM  -- 2  -EM    Moving to and from a bed to a chair (including a wheelchair)? 3  -EM  -- 3  -EM    Standing up from a chair using your arms (e.g., wheelchair, bedside chair)? 3  -EM  -- 3  -EM    Climbing 3-5 steps with a railing? 3  -EM  -- 2  -EM    To walk in hospital room? 3  -EM  -- 3  -EM    AM-PAC 6 Clicks Score 18  -EM  -- 16  -EM       How much help from another is currently needed...    Putting on and taking off regular lower body clothing?  -- 2  -KP  --    Bathing (including washing, rinsing, and drying)  -- 2  -KP  --    Toileting (which includes using toilet bed pan or urinal)  -- 3  -KP  --    Putting on and taking off regular upper body clothing  -- 3  -KP  --    Taking care of personal grooming (such as brushing teeth)  -- 3  -KP  --    Eating meals  -- 4  -KP  --    Score  -- 17  -KP  --       Functional Assessment    Outcome Measure Options  -- AM-PAC 6 Clicks Daily Activity (OT)  - AM-PAC 6 Clicks Basic Mobility (PT)  -EM      User Key  (r) = Recorded By, (t) = Taken By, (c) = Cosigned By    Initials Name Provider Type    SO Joan Anderson, OTR Occupational Therapist    ALMAZ Garcia, OTR Occupational Therapist    CH Lexie Da Silva, PT Physical Therapist    EM Parvin Rincon, PT Physical Therapist    KHANG Flores, OT Occupational Therapist           Time Calculation:         PT Charges     Row Name 06/01/18 1544 06/01/18 1007          Time Calculation    Start Time 1520  -  --     Stop Time 1537  -  --     Time Calculation (min) 17 min  -  --     PT Received On 06/01/18  -  --     PT - Next Appointment  -- 06/01/18  -       User Key  (r) =  Recorded By, (t) = Taken By, (c) = Cosigned By    Initials Name Provider Type    CH Lexie Da Silva, PT Physical Therapist          Therapy Charges for Today     Code Description Service Date Service Provider Modifiers Qty    [id number] HC PT THER PROC EA 15 MIN 6/1/2018 Lexie Da Silva PT GP 1          PT G-Codes  Outcome Measure Options: AM-PAC 6 Clicks Basic Mobility (PT)    PT Discharge Summary  Anticipated Discharge Disposition (PT): home    Lexie Da Silva PT  6/1/2018

## 2018-06-01 NOTE — DISCHARGE SUMMARY
Date of Admission:  5/29/2018  Date of Discharge:  6/15/2018    Discharge Diagnosis:  1. Severe multivessel CAD now s/p CABG  2. HTN  3. HLD  4. DM type II  5. VANDANA with CPAP  6. Osteoarthritis  7. Obesity  8. Post-op expected acute blood loss anemia     Presenting Problem/History of Present Illness:  1. Severe multivessel CAD   2. HTN  3. HLD  4. DM type II  5. VANDANA with CPAP  6. Osteoarthritis  7. Obesity      Hospital Course:   Patient is a 62 y.o. male who was admitted to the hospital on 5/29/18 for elective same day surgery. That day he was taken to the Operating Room and under general anesthesia and via median sternotomy underwent CABG x 3 (LIMA-LAD and SVGs to OM1 and D2) by Dr. Ortega. Patient tolerated the procedure well and was transported to the Open Heart Recovery Unit in stable condition. By the next morning, patient was successfully weaned from the ventilator and extubated. On post-operative day #1 he was transferred out of the Open Heart Recovery Unit to the Cardiac Step-down Unit. His post-operative course was uneventful and one of quick progression. On post-operative day #4 he was deemed stable for discharge home with [facility].          Procedures Performed:  Procedure(s) 5/29/18 by Dr. Ortega:  1. CABG x 3 with LIMA-LAD and SVGs to OM1 and D2  2. EVH of the both legs       Consults:   Consults     Date and Time Order Name Status Description    5/30/2018 0718 Inpatient Cardiology Consult Completed           Pertinent Test Results:    Lab Results   Component Value Date    WBC 6.08 06/02/2018    HGB 9.6 (L) 06/02/2018    HCT 29.9 (L) 06/02/2018    MCV 89.8 06/02/2018     06/02/2018      Lab Results   Component Value Date    GLUCOSE 126 (H) 06/02/2018    CALCIUM 8.5 (L) 06/02/2018     (L) 06/02/2018    K 4.1 06/02/2018    CO2 23.7 06/02/2018     06/02/2018    BUN 21 06/02/2018    CREATININE 0.95 06/02/2018    EGFRIFNONA 80 06/02/2018    BCR 22.1 06/02/2018    ANIONGAP 11.3  06/02/2018     Lab Results   Component Value Date    INR 1.24 (H) 05/30/2018    PROTIME 15.4 (H) 05/30/2018       Condition on Discharge: Stable    Vital Signs  Heart Rate:  [79] 79  BP: (108)/(60) 108/60      Discharge Disposition: Home with [facility]      Discharge Medications     Discharge Medications      New Medications      Instructions Start Date   ALPRAZolam 0.25 MG tablet  Commonly known as:  XANAX   Take 1 tablet by mouth Every 12 (Twelve) Hours As Needed for Anxiety for up to 6 days.      furosemide 40 MG tablet  Commonly known as:  LASIX  Notes to patient:  Next dose tomorrow 6/3/2018   Take 1 tablet by mouth Daily for 3 days.   Start Date:  6/3/2018     HYDROcodone-acetaminophen 5-325 MG per tablet  Commonly known as:  NORCO  Notes to patient:  Last dose today at 8:46 am   Take 1 tablet by mouth Every 4 (Four) Hours As Needed for Moderate Pain  for up to 70 doses.      metoprolol tartrate 50 MG tablet  Commonly known as:  LOPRESSOR  Notes to patient:  Next dose tonight at bedtime 6/2/2018   Take 1 tablet by mouth Every 12 (Twelve) Hours for 30 days.         Continue These Medications      Instructions Start Date   aspirin 81 MG chewable tablet  Notes to patient:  Next dose tomorrow 6/3/2018   Chew 81 mg Daily. PT TO FOLLOW MD INSTRUCTIONS ON STOP DATE FOR SX      cholestyramine 4 g packet  Commonly known as:  QUESTRAN  Notes to patient:  Resume today   Take 1 packet by mouth Every 12 (Twelve) Hours.      diclofenac sodium 100 MG 24 hr tablet  Commonly known as:  VOTAREN XR  Notes to patient:  Resume today 6/2/2018   Take 100 mg by mouth Daily. LAST DOSE 5/23/18      insulin aspart 100 UNIT/ML injection  Commonly known as:  novoLOG  Notes to patient:  Next dose with dinner today 6/2/2018   Inject  under the skin 2 (Two) Times a Day. SLIDING  SCALE AFTER TESTING 2 X DAY-PT ONLY TAKES IF GLUCOSE > 150-RARELY HAS TO TAKE      insulin  UNIT/ML injection  Commonly known as:  humuLIN N,novoLIN  N  Notes to patient:  Next dose with dinner today 6/2/2018   Inject 15 Units under the skin 2 (Two) Times a Day Before Meals.      MENS MULTIVITAMIN PLUS PO  Notes to patient:  Resume dose today 6/2/2018   Take 1 tablet by mouth Daily. ON HOLD FOR SX      metFORMIN 500 MG tablet  Commonly known as:  GLUCOPHAGE  Notes to patient:  Next dose today with dinner 6/2/2018   Take 1 tablet by mouth 2 (Two) Times a Day With Meals.         Stop These Medications    lisinopril 20 MG tablet  Commonly known as:  PRINIVIL,ZESTRIL            Discharge Diet:   Diet Instructions     Heart Healthy Diet              Healthy Heart    Activity at Discharge:     Follow-up Appointments  Future Appointments  Date Time Provider Department Center   7/5/2018 1:15 PM KATHIA Angulo [facility] None   9/16/2018 11:40 AM [facility]         Test Results Pending at Discharge       KATHIA Angulo  06/15/18  4:19 PM    **I was not personally involved in this patient's care and discharge, cosign is for record completion---KATHIA Walker, Cardiothoracic Surgery    Agree with above summary.

## 2018-06-01 NOTE — PROGRESS NOTES
" LOS: 3 days   Patient Care Team:  Malia Meza MD as PCP - General (Family Medicine)  Suzi Ambriz MD as Consulting Physician (Cardiology)    Chief Complaint: post op    Subjective:  Symptoms:  No shortness of breath.    Activity level: Returning to normal.    Pain:  He complains of pain that is mild.  Pain is well controlled.          Vital Signs  Temp:  [97.9 °F (36.6 °C)-98.6 °F (37 °C)] 98.5 °F (36.9 °C)  Heart Rate:  [73-94] 87  Resp:  [16-18] 16  BP: (117-142)/(64-72) 135/67  Body mass index is 36.73 kg/m².    Intake/Output Summary (Last 24 hours) at 06/01/18 0903  Last data filed at 06/01/18 0834   Gross per 24 hour   Intake              960 ml   Output             2100 ml   Net            -1140 ml     I/O this shift:  In: 240 [P.O.:240]  Out: -         1    05/31/18  0505 06/01/18  0445 06/01/18  0500   Weight: 117 kg (259 lb) 116 kg (256 lb) 116 kg (256 lb)         Objective:  General Appearance:  In no acute distress.    Vital signs: (most recent): Blood pressure 135/67, pulse 87, temperature 98.5 °F (36.9 °C), temperature source Oral, resp. rate 16, height 177.8 cm (70\"), weight 116 kg (256 lb), SpO2 95 %.  Vital signs are normal.    Output: Producing urine and producing stool.    HEENT: Normal HEENT exam.    Lungs:  Normal effort and normal respiratory rate.  Breath sounds clear to auscultation.              Results Review:        WBC WBC   Date Value Ref Range Status   06/01/2018 9.49 4.50 - 10.70 10*3/mm3 Final   05/31/2018 10.13 4.50 - 10.70 10*3/mm3 Final   05/30/2018 8.43 4.50 - 10.70 10*3/mm3 Final   05/29/2018 11.14 (H) 4.50 - 10.70 10*3/mm3 Final   05/29/2018 13.90 (H) 4.50 - 10.70 10*3/mm3 Final      HGB Hemoglobin   Date Value Ref Range Status   06/01/2018 11.7 (L) 13.7 - 17.6 g/dL Final   05/31/2018 11.5 (L) 13.7 - 17.6 g/dL Final   05/30/2018 10.2 (L) 13.7 - 17.6 g/dL Final   05/29/2018 10.6 (L) 13.7 - 17.6 g/dL Final   05/29/2018 10.8 (L) 13.7 - 17.6 g/dL Final   05/29/2018 " 8.8 (L) 12.0 - 17.0 g/dL Final   05/29/2018 9.5 (L) 12.0 - 17.0 g/dL Final   05/29/2018 9.9 (L) 12.0 - 17.0 g/dL Final   05/29/2018 9.9 (L) 12.0 - 17.0 g/dL Final   05/29/2018 12.9 12.0 - 17.0 g/dL Final      HCT Hematocrit   Date Value Ref Range Status   06/01/2018 34.7 (L) 40.4 - 52.2 % Final   05/31/2018 34.6 (L) 40.4 - 52.2 % Final   05/30/2018 30.8 (L) 40.4 - 52.2 % Final   05/29/2018 31.5 (L) 40.4 - 52.2 % Final   05/29/2018 31.8 (L) 40.4 - 52.2 % Final   05/29/2018 26 (L) 38 - 51 % Final   05/29/2018 28 (L) 38 - 51 % Final   05/29/2018 29 (L) 38 - 51 % Final   05/29/2018 29 (L) 38 - 51 % Final   05/29/2018 38 38 - 51 % Final      Platelets Platelets   Date Value Ref Range Status   06/01/2018 163 140 - 500 10*3/mm3 Final   05/31/2018 127 (L) 140 - 500 10*3/mm3 Final   05/30/2018 103 (L) 140 - 500 10*3/mm3 Final   05/29/2018 112 (L) 140 - 500 10*3/mm3 Final   05/29/2018 137 (L) 140 - 500 10*3/mm3 Final        PT/INR:    Protime   Date Value Ref Range Status   05/30/2018 15.4 (H) 11.7 - 14.2 Seconds Final   05/29/2018 16.6 (H) 11.7 - 14.2 Seconds Final   /  INR   Date Value Ref Range Status   05/30/2018 1.24 (H) 0.90 - 1.10 Final   05/29/2018 1.37 (H) 0.90 - 1.10 Final       Sodium Sodium   Date Value Ref Range Status   06/01/2018 135 (L) 136 - 145 mmol/L Final   05/31/2018 138 136 - 145 mmol/L Final   05/30/2018 140 136 - 145 mmol/L Final   05/29/2018 141 136 - 145 mmol/L Final   05/29/2018 141 136 - 145 mmol/L Final      Potassium Potassium   Date Value Ref Range Status   06/01/2018 4.4 3.5 - 5.2 mmol/L Final   05/31/2018 5.1 3.5 - 5.2 mmol/L Final   05/30/2018 4.1 3.5 - 5.2 mmol/L Final   05/29/2018 4.2 3.5 - 5.2 mmol/L Final   05/29/2018 4.2 3.5 - 5.2 mmol/L Final      Chloride Chloride   Date Value Ref Range Status   06/01/2018 98 98 - 107 mmol/L Final   05/31/2018 104 98 - 107 mmol/L Final   05/30/2018 106 98 - 107 mmol/L Final   05/29/2018 107 98 - 107 mmol/L Final   05/29/2018 109 (H) 98 - 107 mmol/L  Final      Bicarbonate CO2   Date Value Ref Range Status   06/01/2018 22.6 22.0 - 29.0 mmol/L Final   05/31/2018 20.1 (L) 22.0 - 29.0 mmol/L Final   05/30/2018 20.5 (L) 22.0 - 29.0 mmol/L Final   05/29/2018 19.3 (L) 22.0 - 29.0 mmol/L Final   05/29/2018 23.0 22.0 - 29.0 mmol/L Final      BUN BUN   Date Value Ref Range Status   06/01/2018 20 8 - 23 mg/dL Final   05/31/2018 17 8 - 23 mg/dL Final   05/30/2018 19 8 - 23 mg/dL Final   05/29/2018 18 8 - 23 mg/dL Final   05/29/2018 21 8 - 23 mg/dL Final      Creatinine Creatinine   Date Value Ref Range Status   06/01/2018 1.04 0.76 - 1.27 mg/dL Final   05/31/2018 1.03 0.76 - 1.27 mg/dL Final   05/30/2018 0.95 0.76 - 1.27 mg/dL Final   05/29/2018 0.93 0.76 - 1.27 mg/dL Final   05/29/2018 1.02 0.76 - 1.27 mg/dL Final      Calcium Calcium   Date Value Ref Range Status   06/01/2018 9.0 8.6 - 10.5 mg/dL Final   05/31/2018 8.5 (L) 8.6 - 10.5 mg/dL Final   05/30/2018 7.8 (L) 8.6 - 10.5 mg/dL Final   05/29/2018 7.9 (L) 8.6 - 10.5 mg/dL Final   05/29/2018 8.1 (L) 8.6 - 10.5 mg/dL Final      Magnesium Magnesium   Date Value Ref Range Status   05/30/2018 2.5 (H) 1.6 - 2.4 mg/dL Final   05/29/2018 2.4 1.6 - 2.4 mg/dL Final   05/29/2018 2.4 1.6 - 2.4 mg/dL Final            amiodarone 400 mg Oral Q12H   aspirin 81 mg Oral Daily   chlorhexidine 15 mL Mouth/Throat Q12H   enoxaparin 40 mg Subcutaneous Daily   furosemide 40 mg Oral Daily   guaiFENesin 1,200 mg Oral Q12H   insulin aspart 0-14 Units Subcutaneous 4x Daily With Meals & Nightly   metoprolol tartrate 12.5 mg Oral Once   metoprolol tartrate 50 mg Oral Q12H   mupirocin  Each Nare BID   pantoprazole 40 mg Oral Daily   polyethylene glycol 17 g Oral Daily   potassium chloride 20 mEq Oral Daily   sennosides-docusate sodium 2 tablet Oral Nightly       sodium chloride 30 mL/hr   sodium chloride 30 mL/hr           Patient Active Problem List   Diagnosis Code   • Preop cardiovascular exam Z01.810   • Essential hypertension I10   •  Hyperlipidemia E78.5   • Type 2 diabetes mellitus without complication, with long-term current use of insulin E11.9, Z79.4   • VANDANA (obstructive sleep apnea) G47.33   • Class 2 obesity due to excess calories with serious comorbidity and body mass index (BMI) of 37.0 to 37.9 in adult E66.09, Z68.37   • Abnormal stress echo R94.39   • ASHD (arteriosclerotic heart disease) I25.10   • Coronary artery disease involving native heart without angina pectoris I25.10       Assessment & Plan    -Multivessel CAD-CABGx3 with LIMA- EVH bilateral legs- POD#3 Pagni  -HTN---controlled on current tx  -HL----statin allergy  -DM II----insulin and oral hypoglycemics  -VANDANA----CPAP compliant  -Osteoarthritis----preop eval for right total knee, chronic NSAID use  -post op anemia- expected ABL  -Obesity        Switch to PO lasix today. Increase beta blocker. Mobilize.  Encourage IS and pulmonary toilet. Discontinue AV wires. Looks great anticipate discharge later today or tomorrow.      Sarahi Beasley, APRZACH  06/01/18  9:03 AM    Doing well; discharge home.

## 2018-06-01 NOTE — PLAN OF CARE
Problem: Patient Care Overview  Goal: Plan of Care Review  Outcome: Ongoing (interventions implemented as appropriate)   06/01/18 8783   OTHER   Outcome Summary Pt demonstrates adequate strength and balance to perform functional mobility and gait independently. Pt abl to navigate stairs safely and has acheived his PT goals. PT will sign off at this time. Pt may go home this PM.   Coping/Psychosocial   Plan of Care Reviewed With patient

## 2018-06-01 NOTE — PROGRESS NOTES
Continued Stay Note  [facility]     Patient Name: Mark Manzo  MRN: [id number]  Today's Date: 6/1/2018    Admit Date: 5/29/2018          Discharge Plan     Row Name 06/01/18 1526       Plan    Plan Home with [facility]    Patient/Family in Agreement with Plan yes    Plan Comments Pt has chosen [facility] from list provided to him.  Referral called to angelique Ordaz at 3:26 PM.  Pt s.o. will transport him home               Discharge Codes    No documentation.           Leonor Mckinley RN

## 2018-06-01 NOTE — DISCHARGE PLACEMENT REQUEST
"John Cabrales  (62 y.o. Male)     Date of Birth Social Security Number Address Home Phone MRN    1955  [address] [facility]  [address]  [place] [id number] [phone] [id number]    [employer] Mar[employer] Status          Spiritism        Admission Date Admission Type Admitting Provider Attending Provider Department, Room/Bed    5/29/18 Elective Junior Ortega MD Pagni, Sebastian, MD [facility] CARDIOVASC UNIT, 2225/1    Discharge Date Discharge Disposition Discharge Destination                       Attending Provider:  Junior Ortega MD    Allergies:  Statins    Isolation:  None   Infection:  None   Code Status:  FULL    Ht:  177.8 cm (70\")   Wt:  116 kg (256 lb)    Admission Cmt:  None   Principal Problem:  None                Active Insurance as of 5/29/2018     Primary Coverage     Payor Plan Insurance Group Employer/Plan Group    ANTHEM MEDICARE REPLACEMENT ANTHEM MEDICARE ADVANTAGE [id number]     Payor Plan Address Payor Plan Phone Number Effective From Effective To    [address] [phone] 1/1/2018     [facility] [address]       Subscriber Name Subscriber Birth Date Member ID       JOHN CABRALES 1955 [id number]                 Emergency Contacts      (Rel.) Home Phone Work Phone Mobile Phone    Harvey Cabrales (Son) [phone] -- [phone]    Ingrid Tyler (Significant Other) [phone] -- [phone]              "

## 2018-06-02 VITALS
WEIGHT: 252.8 LBS | HEART RATE: 79 BPM | TEMPERATURE: 98.7 F | DIASTOLIC BLOOD PRESSURE: 68 MMHG | OXYGEN SATURATION: 95 % | SYSTOLIC BLOOD PRESSURE: 121 MMHG | RESPIRATION RATE: 16 BRPM | BODY MASS INDEX: 36.19 KG/M2 | HEIGHT: 70 IN

## 2018-06-02 LAB
ANION GAP SERPL CALCULATED.3IONS-SCNC: 11.3 MMOL/L
BUN BLD-MCNC: 21 MG/DL (ref 8–23)
BUN/CREAT SERPL: 22.1 (ref 7–25)
CALCIUM SPEC-SCNC: 8.5 MG/DL (ref 8.6–10.5)
CHLORIDE SERPL-SCNC: 100 MMOL/L (ref 98–107)
CO2 SERPL-SCNC: 23.7 MMOL/L (ref 22–29)
CREAT BLD-MCNC: 0.95 MG/DL (ref 0.76–1.27)
DEPRECATED RDW RBC AUTO: 41.6 FL (ref 37–54)
ERYTHROCYTE [DISTWIDTH] IN BLOOD BY AUTOMATED COUNT: 12.9 % (ref 11.5–14.5)
GFR SERPL CREATININE-BSD FRML MDRD: 80 ML/MIN/1.73
GLUCOSE BLD-MCNC: 126 MG/DL (ref 65–99)
GLUCOSE BLDC GLUCOMTR-MCNC: 154 MG/DL (ref 70–130)
GLUCOSE BLDC GLUCOMTR-MCNC: 165 MG/DL (ref 70–130)
HCT VFR BLD AUTO: 29.9 % (ref 40.4–52.2)
HGB BLD-MCNC: 9.6 G/DL (ref 13.7–17.6)
MCH RBC QN AUTO: 28.8 PG (ref 27–32.7)
MCHC RBC AUTO-ENTMCNC: 32.1 G/DL (ref 32.6–36.4)
MCV RBC AUTO: 89.8 FL (ref 79.8–96.2)
PLATELET # BLD AUTO: 172 10*3/MM3 (ref 140–500)
PMV BLD AUTO: 9.8 FL (ref 6–12)
POTASSIUM BLD-SCNC: 4.1 MMOL/L (ref 3.5–5.2)
RBC # BLD AUTO: 3.33 10*6/MM3 (ref 4.6–6)
SODIUM BLD-SCNC: 135 MMOL/L (ref 136–145)
WBC NRBC COR # BLD: 6.08 10*3/MM3 (ref 4.5–10.7)

## 2018-06-02 PROCEDURE — 80048 BASIC METABOLIC PNL TOTAL CA: CPT | Performed by: THORACIC SURGERY (CARDIOTHORACIC VASCULAR SURGERY)

## 2018-06-02 PROCEDURE — 85027 COMPLETE CBC AUTOMATED: CPT | Performed by: THORACIC SURGERY (CARDIOTHORACIC VASCULAR SURGERY)

## 2018-06-02 PROCEDURE — 99024 POSTOP FOLLOW-UP VISIT: CPT | Performed by: THORACIC SURGERY (CARDIOTHORACIC VASCULAR SURGERY)

## 2018-06-02 PROCEDURE — 82962 GLUCOSE BLOOD TEST: CPT

## 2018-06-02 PROCEDURE — 99024 POSTOP FOLLOW-UP VISIT: CPT | Performed by: NURSE PRACTITIONER

## 2018-06-02 RX ORDER — FUROSEMIDE 40 MG/1
40 TABLET ORAL DAILY
Qty: 3 TABLET | Refills: 0 | Status: SHIPPED | OUTPATIENT
Start: 2018-06-03 | End: 2018-06-06

## 2018-06-02 RX ORDER — ALPRAZOLAM 0.25 MG/1
0.25 TABLET ORAL EVERY 12 HOURS PRN
Qty: 12 TABLET | Refills: 0 | Status: SHIPPED | OUTPATIENT
Start: 2018-06-02 | End: 2018-06-08

## 2018-06-02 RX ORDER — METOPROLOL TARTRATE 50 MG/1
50 TABLET, FILM COATED ORAL EVERY 12 HOURS SCHEDULED
Qty: 60 TABLET | Refills: 3 | Status: SHIPPED | OUTPATIENT
Start: 2018-06-02 | End: 2018-06-21

## 2018-06-02 RX ADMIN — PANTOPRAZOLE SODIUM 40 MG: 40 TABLET, DELAYED RELEASE ORAL at 08:46

## 2018-06-02 RX ADMIN — LISINOPRIL 5 MG: 5 TABLET ORAL at 08:47

## 2018-06-02 RX ADMIN — ASPIRIN 81 MG: 81 TABLET ORAL at 08:47

## 2018-06-02 RX ADMIN — CHLORHEXIDINE GLUCONATE 15 ML: 1.2 RINSE ORAL at 06:00

## 2018-06-02 RX ADMIN — FUROSEMIDE 40 MG: 40 TABLET ORAL at 08:47

## 2018-06-02 RX ADMIN — METOPROLOL TARTRATE 50 MG: 25 TABLET ORAL at 08:47

## 2018-06-02 RX ADMIN — POTASSIUM CHLORIDE 20 MEQ: 750 CAPSULE, EXTENDED RELEASE ORAL at 08:47

## 2018-06-02 RX ADMIN — METFORMIN HYDROCHLORIDE 500 MG: 500 TABLET ORAL at 08:47

## 2018-06-02 RX ADMIN — HYDROCODONE BITARTRATE AND ACETAMINOPHEN 2 TABLET: 5; 325 TABLET ORAL at 08:46

## 2018-06-02 RX ADMIN — GUAIFENESIN 1200 MG: 600 TABLET, EXTENDED RELEASE ORAL at 08:47

## 2018-06-02 NOTE — PROGRESS NOTES
LOS: 4 days   Patient Care Team:  Malia Meza MD as PCP - General (Family Medicine)  Suzi Ambriz MD as Consulting Physician (Cardiology)    Chief Complaint:     CAD, s/p CABG, HTN, HLD, DM Type II    Interval History:     Mr. Bello is a pleasant 62-year-old male patient of Dr. Suzi Alejandre.  He was admitted to the hospital on 5/29/18 for elective surgery and underwent coronary artery bypass graft ×3 disease LIMA-LAD and SVGs to OM1 and D2 performed by Dr. Junior Ortega.     He has done well postoperatively and only reports mild incisional discomfort.  He denies chest pain, shortness of breath, or dizziness.    Objective   Vital Signs  Temp:  [98.7 °F (37.1 °C)-99.2 °F (37.3 °C)] 98.7 °F (37.1 °C)  Heart Rate:  [73-92] 79  Resp:  [14-18] 16  BP: (111-135)/(58-74) 121/68    Intake/Output Summary (Last 24 hours) at 06/02/18 1248  Last data filed at 06/02/18 0728   Gross per 24 hour   Intake              840 ml   Output             2200 ml   Net            -1360 ml           Physical Exam     Vitals Reviewed.   General Appearance: No acute distress, well developed and well nourished.   Eyes: Conjunctiva and lids: No erythema, swelling, or discharge. Sclera non-icteric.   HENT: Atraumatic, normocephalic. External eyes, ears, and nose normal. No hearing loss noted. Mucous membranes normal. Lips not cyanotic. Neck supple with no tenderness.  Respiratory: No signs of respiratory distress. Respiration rhythm and depth normal.   Clear to auscultation. No rales, crackles, rhonchi, or wheezing auscultated.   Cardiovascular:  Jugular Venous Pressure: Normal  Heart Rate and Rhythm: Normal, Heart Sounds: Normal S1 and S2. No S3 or S4 noted.  Murmurs: No murmurs noted. No rubs, thrills, or gallops.   Chest incision clean dry and intact and leg incision clean dry and intact.  Arterial Pulses: Carotid pulses normal. No carotid bruit noted. Posterior tibialis and dorsalis pedis pulses normal.   Lower  Extremities: No edema noted.  Gastrointestinal:  Abdomen soft, non-distended, non-tender. Normal bowel sounds. No hepatomegaly.   Musculoskeletal: Normal movement of extremities  Skin and Nails: General appearance normal. No pallor, cyanosis, diaphoresis. Skin temperature normal. No clubbing of fingernails.   Psychiatric: Patient alert and oriented to person, place, and time. Speech and behavior appropriate. Normal mood and affect.     Results Review:        Results from last 7 days  Lab Units 06/02/18  0321 06/01/18  0402 05/31/18  0344   SODIUM mmol/L 135* 135* 138   POTASSIUM mmol/L 4.1 4.4 5.1   CHLORIDE mmol/L 100 98 104   CO2 mmol/L 23.7 22.6 20.1*   BUN mg/dL 21 20 17   CREATININE mg/dL 0.95 1.04 1.03   GLUCOSE mg/dL 126* 148* 165*   CALCIUM mg/dL 8.5* 9.0 8.5*           Results from last 7 days  Lab Units 06/02/18  0321 06/01/18  0402 05/31/18  0719   WBC 10*3/mm3 6.08 9.49 10.13   HEMOGLOBIN g/dL 9.6* 11.7* 11.5*   HEMATOCRIT % 29.9* 34.7* 34.6*   PLATELETS 10*3/mm3 172 163 127*       Results from last 7 days  Lab Units 05/30/18  0304 05/29/18  1644   INR  1.24* 1.37*   APTT seconds  --  32.7           Results from last 7 days  Lab Units 05/30/18  0304   MAGNESIUM mg/dL 2.5*           I reviewed the patient's new clinical results.  I personally viewed and interpreted the patient's EKG/Telemetry data        Medication Review:     aspirin 81 mg Oral Daily   chlorhexidine 15 mL Mouth/Throat Q12H   furosemide 40 mg Oral Daily   guaiFENesin 1,200 mg Oral Q12H   insulin aspart 0-14 Units Subcutaneous 4x Daily With Meals & Nightly   lisinopril 5 mg Oral Q24H   metFORMIN 500 mg Oral BID With Meals   metoprolol tartrate 50 mg Oral Q12H   mupirocin  Each Nare BID   pantoprazole 40 mg Oral Daily   polyethylene glycol 17 g Oral Daily   potassium chloride 20 mEq Oral Daily   sennosides-docusate sodium 2 tablet Oral Nightly       Plan:     1.  Coronary Artery Disease: Status post CABG ×3 (LIMA-LAD and SVGs to OM1 and  D2).  Doing well POD #4.  Cardiac surgery has discharged the patient for today.    2.  Hypertension: Blood pressure is well-controlled.    3.  Hyperlipidemia: Remains on statin therapy with LDL goal less than 70.    4.  Type 2 diabetes mellitus: Recommended good blood sugar control.    5.  He will follow-up with me in approximately one week.     Sincerely,     Hanna Kumar, KATHIA  06/02/18  12:48 PM

## 2018-06-02 NOTE — PROGRESS NOTES
" LOS: 4 days   Patient Care Team:  Malia Meza MD as PCP - General (Family Medicine)  Suzi Ambriz MD as Consulting Physician (Cardiology)    Chief Complaint: post op    Subjective:  Symptoms:  No shortness of breath or anxiety.    Diet:  Adequate intake.    Activity level: Normal.    Pain:  He complains of pain that is mild.  He reports pain is improving.  Pain is well controlled.          Vital Signs  Temp:  [98.7 °F (37.1 °C)-99.2 °F (37.3 °C)] 98.7 °F (37.1 °C)  Heart Rate:  [73-92] 79  Resp:  [14-18] 16  BP: (111-154)/(58-80) 121/68  Body mass index is 36.27 kg/m².    Intake/Output Summary (Last 24 hours) at 06/02/18 1027  Last data filed at 06/02/18 0728   Gross per 24 hour   Intake             1200 ml   Output             2200 ml   Net            -1000 ml     I/O this shift:  In: 360 [P.O.:360]  Out: -         1    06/01/18  0445 06/01/18  0500 06/02/18  0600   Weight: 116 kg (256 lb) 116 kg (256 lb) 115 kg (252 lb 12.8 oz)         Objective:  General Appearance:  In no acute distress and comfortable.    Vital signs: (most recent): Blood pressure 121/68, pulse 79, temperature 98.7 °F (37.1 °C), temperature source Oral, resp. rate 16, height 177.8 cm (70\"), weight 115 kg (252 lb 12.8 oz), SpO2 95 %.  Vital signs are normal.  No fever.    Output: Producing urine and producing stool.    HEENT: Normal HEENT exam.    Lungs:  Normal effort and normal respiratory rate.  Breath sounds clear to auscultation.  He is not in respiratory distress.    Heart: Normal rate.  Regular rhythm.  No gallop or friction rub.   Chest: Symmetric chest wall expansion. (Sternal incision clean, dry, and intact.  Sternum stable to palpation.)  Abdomen: Abdomen is soft and non-distended.  Bowel sounds are normal.   There is no abdominal tenderness.     Extremities: Normal range of motion.    Pulses: Distal pulses are intact.    Neurological: Patient is alert and oriented to person, place and time.  GCS score is 15.  "   Skin:  Warm and dry.              Results Review:        WBC WBC   Date Value Ref Range Status   06/02/2018 6.08 4.50 - 10.70 10*3/mm3 Final   06/01/2018 9.49 4.50 - 10.70 10*3/mm3 Final   05/31/2018 10.13 4.50 - 10.70 10*3/mm3 Final      HGB Hemoglobin   Date Value Ref Range Status   06/02/2018 9.6 (L) 13.7 - 17.6 g/dL Final   06/01/2018 11.7 (L) 13.7 - 17.6 g/dL Final   05/31/2018 11.5 (L) 13.7 - 17.6 g/dL Final      HCT Hematocrit   Date Value Ref Range Status   06/02/2018 29.9 (L) 40.4 - 52.2 % Final   06/01/2018 34.7 (L) 40.4 - 52.2 % Final   05/31/2018 34.6 (L) 40.4 - 52.2 % Final      Platelets Platelets   Date Value Ref Range Status   06/02/2018 172 140 - 500 10*3/mm3 Final   06/01/2018 163 140 - 500 10*3/mm3 Final   05/31/2018 127 (L) 140 - 500 10*3/mm3 Final        PT/INR:    No results found for: PROTIME/  No results found for: INR    Sodium Sodium   Date Value Ref Range Status   06/02/2018 135 (L) 136 - 145 mmol/L Final   06/01/2018 135 (L) 136 - 145 mmol/L Final   05/31/2018 138 136 - 145 mmol/L Final      Potassium Potassium   Date Value Ref Range Status   06/02/2018 4.1 3.5 - 5.2 mmol/L Final   06/01/2018 4.4 3.5 - 5.2 mmol/L Final   05/31/2018 5.1 3.5 - 5.2 mmol/L Final      Chloride Chloride   Date Value Ref Range Status   06/02/2018 100 98 - 107 mmol/L Final   06/01/2018 98 98 - 107 mmol/L Final   05/31/2018 104 98 - 107 mmol/L Final      Bicarbonate CO2   Date Value Ref Range Status   06/02/2018 23.7 22.0 - 29.0 mmol/L Final   06/01/2018 22.6 22.0 - 29.0 mmol/L Final   05/31/2018 20.1 (L) 22.0 - 29.0 mmol/L Final      BUN BUN   Date Value Ref Range Status   06/02/2018 21 8 - 23 mg/dL Final   06/01/2018 20 8 - 23 mg/dL Final   05/31/2018 17 8 - 23 mg/dL Final      Creatinine Creatinine   Date Value Ref Range Status   06/02/2018 0.95 0.76 - 1.27 mg/dL Final   06/01/2018 1.04 0.76 - 1.27 mg/dL Final   05/31/2018 1.03 0.76 - 1.27 mg/dL Final      Calcium Calcium   Date Value Ref Range Status    06/02/2018 8.5 (L) 8.6 - 10.5 mg/dL Final   06/01/2018 9.0 8.6 - 10.5 mg/dL Final   05/31/2018 8.5 (L) 8.6 - 10.5 mg/dL Final      Magnesium No results found for: MG         aspirin 81 mg Oral Daily   chlorhexidine 15 mL Mouth/Throat Q12H   enoxaparin 40 mg Subcutaneous Daily   furosemide 40 mg Oral Daily   guaiFENesin 1,200 mg Oral Q12H   insulin aspart 0-14 Units Subcutaneous 4x Daily With Meals & Nightly   lisinopril 5 mg Oral Q24H   metFORMIN 500 mg Oral BID With Meals   metoprolol tartrate 50 mg Oral Q12H   mupirocin  Each Nare BID   pantoprazole 40 mg Oral Daily   polyethylene glycol 17 g Oral Daily   potassium chloride 20 mEq Oral Daily   sennosides-docusate sodium 2 tablet Oral Nightly       sodium chloride 30 mL/hr   sodium chloride 30 mL/hr           Patient Active Problem List   Diagnosis Code   • Preop cardiovascular exam Z01.810   • Essential hypertension I10   • Hyperlipidemia E78.5   • Type 2 diabetes mellitus without complication, with long-term current use of insulin E11.9, Z79.4   • VANDANA (obstructive sleep apnea) G47.33   • Class 2 obesity due to excess calories with serious comorbidity and body mass index (BMI) of 37.0 to 37.9 in adult E66.09, Z68.37   • Abnormal stress echo R94.39   • ASHD (arteriosclerotic heart disease) I25.10   • Coronary artery disease involving native heart without angina pectoris I25.10       Assessment & Plan    -Multivessel CAD-CABGx3 with LIMA- EVH bilateral legs- POD#4 Pagni    CV stable.  Pulmonary toileting, incentive spirometer.  Mobilize.  By mouth as tolerated.  Nonoliguric.  No ID issues.    Doing well, discharge home today.      Lashonda Gomez MD  06/02/18  10:27 AM

## 2018-06-03 LAB
ABO + RH BLD: NORMAL
ABO + RH BLD: NORMAL
BH BB BLOOD EXPIRATION DATE: NORMAL
BH BB BLOOD EXPIRATION DATE: NORMAL
BH BB BLOOD TYPE BARCODE: 9500
BH BB BLOOD TYPE BARCODE: 9500
BH BB DISPENSE STATUS: NORMAL
BH BB DISPENSE STATUS: NORMAL
BH BB PRODUCT CODE: NORMAL
BH BB PRODUCT CODE: NORMAL
BH BB UNIT NUMBER: NORMAL
BH BB UNIT NUMBER: NORMAL
CROSSMATCH INTERPRETATION: NORMAL
CROSSMATCH INTERPRETATION: NORMAL
UNIT  ABO: NORMAL
UNIT  ABO: NORMAL
UNIT  RH: NORMAL
UNIT  RH: NORMAL

## 2018-06-04 NOTE — PROGRESS NOTES
Case Management Discharge Note    Final Note: Home with YAN Sanchez CSW     Destination     No service coordination in this encounter.      Durable Medical Equipment     No service coordination in this encounter.      Dialysis/Infusion     No service coordination in this encounter.      Home Medical Care     Service Request Status Selected Specialties Address Phone Number Fax Number    [facility] [address] [facility] [address] [address] [phone]    [facility] Declined  not accepting pt [facility] plan N/A [address] [facility] [address] [id number] [phone]      Social Care     No service coordination in this encounter.        Other:  (private auto )

## 2018-06-05 ENCOUNTER — OFFICE VISIT (OUTPATIENT)
Dept: CARDIAC SURGERY | Facility: CLINIC | Age: 63
End: 2018-06-05

## 2018-06-05 ENCOUNTER — TELEPHONE (OUTPATIENT)
Dept: CARDIAC SURGERY | Facility: CLINIC | Age: 63
End: 2018-06-05

## 2018-06-05 VITALS
TEMPERATURE: 98.2 F | SYSTOLIC BLOOD PRESSURE: 148 MMHG | BODY MASS INDEX: 35.36 KG/M2 | WEIGHT: 247 LBS | HEIGHT: 70 IN | DIASTOLIC BLOOD PRESSURE: 89 MMHG

## 2018-06-05 DIAGNOSIS — Z95.1 S/P CABG X 3: Primary | ICD-10-CM

## 2018-06-05 PROCEDURE — 99024 POSTOP FOLLOW-UP VISIT: CPT | Performed by: NURSE PRACTITIONER

## 2018-06-05 NOTE — TELEPHONE ENCOUNTER
Mr Jovany called concerned that one of his SVHS did not look good. He says it is red , bruised, swelling and warm to the touch. The pain in this area is also increasing. He was agreeable to being seen in office today at 11am

## 2018-06-05 NOTE — PROGRESS NOTES
"CARDIOVASCULAR SURGERY FOLLOW-UP PROGRESS NOTE  Chief Complaint: Graft site bruising        HPI:   Dear Dr. Malia Meza MD, MD and colleagues:    It was nice to see Mark Manzo in follow up 1 week after surgery.  As you know, he is a 62 y.o. male with CAD who underwent CABG ×3 with endovascular harvest bilateral lower extremities on 5/29/2018 with Dr. Ortega. He did well postoperatively and continues to do well. He comes in today complaining of left thigh bruising and pain.  His activity level has been good.  From a surgical standpoint, the sternal incision is well approximated without erythema, edema, or drainage.  The sternum is stable to palpation, and the patient denies any popping or clicking with deep inspiration or coughing.  His left thigh has mild edema with significant bruising and mild to moderate hematoma in the mid thigh; there is mild warmth but no erythema is noted and no drainage from the incision site.  Dr. Ortega evaluated in conjunction and agrees that no antibiotics or other intervention is needed at this time.      Physical Exam:         /89   Temp 98.2 °F (36.8 °C) (Oral)   Ht 177.8 cm (70\")   Wt 112 kg (247 lb)   BMI 35.44 kg/m²   Heart:  regular rate and rhythm, S1, S2 normal, no murmur, click, rub or gallop  Lungs:  clear to auscultation bilaterally  Extremities:  1+ lower extremity edema bilaterally  Incision(s):  mid chest healing well    Assessment/Plan:     S/P CABG. Overall, he is doing well.    No significant post-op complications    No heavy lifting > 10 pounds for 5 more weeks, return to office at scheduled appointment unless increased in redness or drainage from the incision site      Thank you for allowing me to participate in the care of your patient.    Regards,  KATHIA Angulo      "

## 2018-06-08 ENCOUNTER — TELEPHONE (OUTPATIENT)
Dept: CARDIOLOGY | Facility: CLINIC | Age: 63
End: 2018-06-08

## 2018-06-08 NOTE — TELEPHONE ENCOUNTER
He can continue to take a half tablet of metoprolol twice daily until his follow-up visit with me.  Thank you

## 2018-06-08 NOTE — TELEPHONE ENCOUNTER
Pt called and is concerned his BP is too low.  Yesterday at his PCP's office it was 101/62 and this morning at home is 101/81.  Pt states he is taking Metoprolol Tart 50mg BID.  He states this morning he took 1/2 tablet of the Metoprolol Tart.  Mr. Manzo has an upcoming appt with you on 6/15/18.  Please advise/[facility]    # [phone]

## 2018-06-11 ENCOUNTER — TELEPHONE (OUTPATIENT)
Dept: CARDIAC SURGERY | Facility: CLINIC | Age: 63
End: 2018-06-11

## 2018-06-11 NOTE — TELEPHONE ENCOUNTER
Patient called earlier today and left a message that he would like to see Mimi BAE concerning hid SVHS. I called him back and left a message for him to call our office

## 2018-06-14 ENCOUNTER — OFFICE VISIT (OUTPATIENT)
Dept: CARDIAC SURGERY | Facility: CLINIC | Age: 63
End: 2018-06-14

## 2018-06-14 VITALS
HEIGHT: 70 IN | RESPIRATION RATE: 16 BRPM | SYSTOLIC BLOOD PRESSURE: 112 MMHG | DIASTOLIC BLOOD PRESSURE: 71 MMHG | TEMPERATURE: 98.3 F | WEIGHT: 243 LBS | BODY MASS INDEX: 34.79 KG/M2 | HEART RATE: 85 BPM

## 2018-06-14 DIAGNOSIS — Z95.1 S/P CABG X 3: Primary | ICD-10-CM

## 2018-06-14 DIAGNOSIS — T14.8XXA HEMATOMA: ICD-10-CM

## 2018-06-14 PROCEDURE — 99024 POSTOP FOLLOW-UP VISIT: CPT | Performed by: NURSE PRACTITIONER

## 2018-06-14 RX ORDER — LANCETS 33 GAUGE
EACH MISCELLANEOUS
COMMUNITY
Start: 2018-06-11

## 2018-06-14 NOTE — PROGRESS NOTES
"CARDIOVASCULAR SURGERY FOLLOW-UP PROGRESS NOTE  Chief Complaint: Follow-up postop        HPI:   Dear Dr. Malia Meza MD, MD and colleagues:    It was nice to see Mark Manzo in follow up 2 weeks after surgery.  As you know, he is a 62 y.o. male with CAD who underwent CABG ×3 on 5/29/2018 with Dr. Ortega, I last saw him in the office on 6/5/2018 in conjunction with Dr. Ortega with concern over a left thigh hematoma.  The patient presents today with similar complaints, the bruising is significantly reduced, however he states that on Monday he had significant erythema and edema prompting him to call for an additional appointment.  Upon examination of the left thigh it is similar in appearance to his last office visit, the hematoma is significantly reduced and continues to follow along the track of the scope insertion.  There is pain with palpation of the hematoma site, there is no erythema, no fluctuance, or any signs of infection.  The patient states that it does look significantly better than it did earlier in the week, and it appears to me to be resolving without intervention at this point.  I did reiterate the need to return to the office should he started experiencing any increase in erythema or drainage, and I would be happy to reevaluate again.    Physical Exam:         /71 (BP Location: Left arm, Patient Position: Sitting, Cuff Size: Large Adult)   Pulse 85   Temp 98.3 °F (36.8 °C)   Resp 16   Ht 177.8 cm (70\")   Wt 110 kg (243 lb)   BMI 34.87 kg/m²   Heart:  regular rate and rhythm  Lungs:  clear to auscultation bilaterally  Extremities:  trace lower extremity edema bilaterally  Incision(s):  mid chest healing well    Assessment/Plan:     S/P CABG. Overall, he is doing well.    Left thigh hematoma at the graft site    No heavy lifting > 10 pounds for 4 more weeks, no lifting over 50 pounds until 3 months from the date of surgery    No restrictions of activity    Return to office at scheduled " appointment in July.      Thank you for allowing me to participate in the care of your patient.    Regards,  KATHIA Angulo

## 2018-06-15 ENCOUNTER — OFFICE VISIT (OUTPATIENT)
Dept: CARDIOLOGY | Facility: CLINIC | Age: 63
End: 2018-06-15

## 2018-06-15 VITALS
BODY MASS INDEX: 34.33 KG/M2 | SYSTOLIC BLOOD PRESSURE: 108 MMHG | HEIGHT: 70 IN | WEIGHT: 239.8 LBS | DIASTOLIC BLOOD PRESSURE: 60 MMHG | HEART RATE: 79 BPM

## 2018-06-15 DIAGNOSIS — E78.5 HYPERLIPIDEMIA, UNSPECIFIED HYPERLIPIDEMIA TYPE: ICD-10-CM

## 2018-06-15 DIAGNOSIS — E11.9 TYPE 2 DIABETES MELLITUS WITHOUT COMPLICATION, WITH LONG-TERM CURRENT USE OF INSULIN (HCC): ICD-10-CM

## 2018-06-15 DIAGNOSIS — I25.10 CORONARY ARTERY DISEASE INVOLVING NATIVE CORONARY ARTERY OF NATIVE HEART WITHOUT ANGINA PECTORIS: Primary | ICD-10-CM

## 2018-06-15 DIAGNOSIS — I44.0 FIRST DEGREE AV BLOCK: ICD-10-CM

## 2018-06-15 DIAGNOSIS — I10 ESSENTIAL HYPERTENSION: ICD-10-CM

## 2018-06-15 DIAGNOSIS — G47.33 OSA (OBSTRUCTIVE SLEEP APNEA): ICD-10-CM

## 2018-06-15 DIAGNOSIS — Z95.1 S/P CABG X 3: ICD-10-CM

## 2018-06-15 DIAGNOSIS — Z79.4 TYPE 2 DIABETES MELLITUS WITHOUT COMPLICATION, WITH LONG-TERM CURRENT USE OF INSULIN (HCC): ICD-10-CM

## 2018-06-15 PROBLEM — Z01.810 PREOP CARDIOVASCULAR EXAM: Status: RESOLVED | Noted: 2018-05-17 | Resolved: 2018-06-15

## 2018-06-15 PROCEDURE — 99214 OFFICE O/P EST MOD 30 MIN: CPT | Performed by: NURSE PRACTITIONER

## 2018-06-15 PROCEDURE — 93000 ELECTROCARDIOGRAM COMPLETE: CPT | Performed by: NURSE PRACTITIONER

## 2018-06-15 NOTE — PROGRESS NOTES
Date of Office Visit: 06/15/2018  Encounter Provider: KATHIA Howe  Place of Service: [facility] CARDIOLOGY  Patient Name: Mark Manzo  :1955    Chief Complaint   Patient presents with   • Coronary Artery Disease   :     HPI: Mark Manzo is a 62 y.o. male who presents today for hospital follow up. He has a history of type 2 diabetes mellitus, hyperlipidemia, hypertension, obesity, obstructive sleep apnea on CPAP, alcohol dependence now in remission, and mild right carotid artery stenosis.      In preparation for preoperative evaluation for right total knee replacement he had a stress echocardiogram completed which was abnormal showing ischemia.  He then underwent cardiac catheterization on 18 which showed severe three-vessel coronary artery disease and was referred for coronary artery bypass grafting which was completed on 18 by Dr. Junior Ortega (LIMA to LAD, SVG to OM1, and SVG to diagonal 2).  He was noted to have a normal ejection fraction.    On 18 he was concerned that he was becoming hypotensive.  I recommended decreasing his metoprolol tartrate from 50 mg twice daily to 25 mg twice daily.    Mr. Manzo presents today for follow-up.  He does report some soreness of his incision but it is improving.  He had some dyspnea for 2 days post surgery which has now resolved.  He's had 2 episodes of dizziness with quick positional changes.  He remains fatigued.  He denies chest pain, PND, orthopnea, edema, palpitations, or syncope.  His blood pressure and heart rate are both well-controlled.      The following portion of the patient's history were reviewed and updated as appropriate: past medical history, past surgical history, past social history, past family history, allergies, current medications, and problem list.    Past Medical History:   Diagnosis Date   • Acute pain of right knee     H/O   • Arthritis    • Body aches    • Carotid artery stenosis  05/25/2018    mid right internal carotid artery mild stenosis; left internal artery plaque noted per duplex 5/25/2018   • Chronic left hip pain    • Coronary artery disease    • Decreased energy    • Depression     IN PAST   • Diabetes mellitus     TYPE 2   • Fatigue    • First degree AV block 6/21/2018   • History of allergic rhinitis    • History of left hip replacement    • Hyperlipidemia    • Hypertension    • Joint pain    • Sleep apnea     USUS C-PAP   • Swelling    • Weight gain        Past Surgical History:   Procedure Laterality Date   • CARDIAC CATHETERIZATION N/A 5/23/2018    Dr. Elie Sarmiento.  Left main normal, LAD long 90% proximal and mid LAD diffuse 20% disease, first diagonal 90% lesion, second diagonal 90% lesion, OM1 90% proximal lesion, 30% distal disease PDA, 100% occluded RCA   • CARDIAC CATHETERIZATION N/A 5/23/2018    Procedure: Left Heart Cath;  Surgeon: Charles Sarmiento MD;  Location: [facility] INVASIVE LOCATION;  Service: Cardiovascular   • CARDIAC CATHETERIZATION N/A 5/23/2018    Procedure: Left ventriculography;  Surgeon: Charles Sarmiento MD;  Location: [facility] INVASIVE LOCATION;  Service: Cardiovascular   • COLONOSCOPY     • CORONARY ARTERY BYPASS GRAFT N/A 5/29/2018    Procedure: SAGE STERNOTOMY CORONARY ARTERY BYPASS GRAFT TIMES 3 USING LEFT INTERNAL MAMMARY ARTERY AND LEFT GREATER SAPHENOUS VEIN GRAFT PER ENDOSCOPIC VEIN HARVESTING AND PRP;  Surgeon: Junior Ortega MD;  Location: [facility] OR;  Service: Cardiothoracic   • OTHER SURGICAL HISTORY      Rectal Surgery Of Perirectal Fistula   • TONSILLECTOMY     • TOTAL HIP ARTHROPLASTY Left    • VASECTOMY         Social History     Social History   • Marital status:      Spouse name: N/A   • Number of children: N/A   • Years of education: N/A     Occupational History   • Not on file.     Social History Main Topics   • Smoking status: Former Smoker     Quit date: 5/25/1993   • Smokeless tobacco: Former User   • Alcohol use  Not on file      Comment: sober for 24 years -CHRONIC ALCOHOLIC   • Drug use: No      Comment: clean for 24 years    • Sexual activity: Defer       Family History   Problem Relation Age of Onset   • Hypertension Mother    • Heart attack Father    • Stroke Father    • Hypertension Father    • Diabetes Sister    • Stroke Paternal Grandfather    • Diabetes Sister    • Malig Hyperthermia Neg Hx        Review of Systems   Constitution: Positive for malaise/fatigue. Negative for chills, diaphoresis, fever, night sweats, weight gain and weight loss.   HENT: Positive for hearing loss. Negative for nosebleeds, sore throat and tinnitus.    Eyes: Negative for blurred vision, double vision, pain and visual disturbance.   Cardiovascular: Negative for chest pain, claudication, cyanosis, dyspnea on exertion, irregular heartbeat, leg swelling, near-syncope, orthopnea, palpitations, paroxysmal nocturnal dyspnea and syncope.   Respiratory: Positive for shortness of breath (two days ) and snoring. Negative for cough, hemoptysis and wheezing.    Endocrine: Negative for cold intolerance, heat intolerance and polyuria.   Hematologic/Lymphatic: Negative for bleeding problem. Does not bruise/bleed easily.   Skin: Negative for color change, dry skin, flushing and itching.   Musculoskeletal: Positive for joint pain. Negative for falls, joint swelling, muscle cramps, muscle weakness and myalgias.   Gastrointestinal: Negative for abdominal pain, constipation, heartburn, melena, nausea and vomiting.   Genitourinary: Negative for dysuria and hematuria.   Neurological: Positive for dizziness (two episodes). Negative for excessive daytime sleepiness, light-headedness, loss of balance, numbness, paresthesias, seizures and vertigo.   Psychiatric/Behavioral: Negative for altered mental status, depression, memory loss and substance abuse. The patient does not have insomnia and is not nervous/anxious.    Allergic/Immunologic: Negative for environmental  "allergies.       Allergies   Allergen Reactions   • Statins Myalgia     FATIGUE, CANNOT SLEEP         Current Outpatient Prescriptions:   •  aspirin 81 MG chewable tablet, Chew 81 mg Daily. PT TO FOLLOW MD INSTRUCTIONS ON STOP DATE FOR SX, Disp: , Rfl:   •  insulin aspart (novoLOG) 100 UNIT/ML injection, Inject  under the skin 2 (Two) Times a Day. SLIDING  SCALE AFTER TESTING 2 X DAY-PT ONLY TAKES IF GLUCOSE > 150-RARELY HAS TO TAKE, Disp: , Rfl:   •  insulin NPH (humuLIN N,novoLIN N) 100 UNIT/ML injection, Inject 15 Units under the skin 2 (Two) Times a Day Before Meals., Disp: , Rfl:   •  metFORMIN (GLUCOPHAGE) 500 MG tablet, Take 1 tablet by mouth 2 (Two) Times a Day With Meals., Disp: , Rfl:   •  metoprolol tartrate (LOPRESSOR) 25 MG tablet, Take 1 tablet by mouth 2 (Two) Times a Day., Disp: 60 tablet, Rfl: 2  •  Multiple Vitamins-Minerals (MENS MULTIVITAMIN PLUS PO), Take 1 tablet by mouth Daily. ON HOLD FOR SX, Disp: , Rfl:   •  ONE TOUCH ULTRA TEST test strip, , Disp: , Rfl:   •  ONETOUCH DELICA LANCETS 33G misc, , Disp: , Rfl:       Objective:     Vitals:    06/15/18 1342   BP: 108/60   BP Location: Right arm   Pulse: 79   Weight: 109 kg (239 lb 12.8 oz)   Height: 177.8 cm (70\")     Body mass index is 34.41 kg/m².    PHYSICAL EXAM:    Vitals Reviewed.   General Appearance: No acute distress, well developed and well nourished. Obese.   Eyes: Conjunctiva and lids: No erythema, swelling, or discharge. Sclera non-icteric.   HENT: Atraumatic, normocephalic. External eyes, ears, and nose normal. No hearing loss noted. Mucous membranes normal. Lips not cyanotic. Neck supple with no tenderness.  Respiratory: No signs of respiratory distress. Respiration rhythm and depth normal.   Clear to auscultation. No rales, crackles, rhonchi, or wheezing auscultated.   Cardiovascular:  Jugular Venous Pressure: Normal  Heart Rate and Rhythm: Normal, Heart Sounds: Normal S1 and S2. No S3 or S4 noted.  Murmurs: No murmurs noted. No " rubs, thrills, or gallops.   Chest incision and right leg incision: Clean dry and intact  Arterial Pulses: Carotid pulses normal. No carotid bruit noted. Posterior tibialis and dorsalis pedis pulses normal.   Lower Extremities: No edema noted.  Gastrointestinal:  Abdomen soft, non-distended, non-tender. Normal bowel sounds. No hepatomegaly.   Musculoskeletal: Normal movement of extremities  Skin and Nails: General appearance normal. No pallor, cyanosis, diaphoresis. Skin temperature normal. No clubbing of fingernails.   Psychiatric: Patient alert and oriented to person, place, and time. Speech and behavior appropriate. Normal mood and affect.       ECG 12 Lead  Date/Time: 6/15/2018 1:41 PM  Performed by: MELVIN ROCHA  Authorized by: MELVIN ROCHA   Comparison: compared with previous ECG from 5/31/2018  Similar to previous ECG  Rhythm: sinus rhythm  Rate: normal  BPM: 79  Conduction: 1st degree  ST Segments: ST segments normal  QRS axis: normal  Other findings: LAE  Clinical impression: non-specific ECG              Assessment:       Diagnosis Plan   1. Coronary artery disease involving native coronary artery of native heart without angina pectoris  Ambulatory Referral to Cardiac Rehab    metoprolol tartrate (LOPRESSOR) 25 MG tablet   2. S/P CABG x 3  Ambulatory Referral to Cardiac Rehab   3. Essential hypertension     4. First degree AV block  metoprolol tartrate (LOPRESSOR) 25 MG tablet   5. Hyperlipidemia, unspecified hyperlipidemia type     6. VANDANA (obstructive sleep apnea)     7. Type 2 diabetes mellitus without complication, with long-term current use of insulin            Plan:       1. Coronary Artery Disease: He is doing well status post coronary artery bypass graft surgery and his incisions are healing well.  Blood pressure and heart rate are normal today on the lower dose of metoprolol tartrate 25 mg twice daily.  I reviewed his home blood pressure and heart rates and they have been normal.  He will  continue on aspirin therapy.  Risk factor modification was discussed with the patient and he would like to enroll in cardiac rehabilitation [facility].    Coronary Artery Disease  Assessment  • The patient has no angina    Plan  • Lifestyle modifications discussed include adhering to a heart healthy diet, maintenance of a healthy weight, medication compliance, regular exercise and regular monitoring of cholesterol and blood pressure    Subjective - Objective  • There is a history of previous coronary artery bypass graft  • Current antiplatelet therapy includes aspirin 81 mg      2.  Hypertension: Blood pressure is well-controlled.  3.  First-degree AV block: FL interval 219 on EKG.  4.  Hyperlipidemia: He has been intolerant to statin therapy in the past due to myalgias.  5.  Obstructive Sleep apnea: Compliant with CPAP machine.  6.  Type 2 diabetes mellitus: I've recommended good blood sugar control.  7.  Follow up with Dr. Suzi Ambriz as scheduled in August.    As always, it has been a pleasure to participate in your patient's care.      Sincerely,         KATHIA Jane

## 2018-06-18 ENCOUNTER — TELEPHONE (OUTPATIENT)
Dept: CARDIOLOGY | Facility: CLINIC | Age: 63
End: 2018-06-18

## 2018-06-18 NOTE — TELEPHONE ENCOUNTER
----- Message from Yolette GERARDO Krystyna sent at 6/15/2018  3:19 PM EDT -----  Regardin Month Follow up with Dr Ambriz  Mr. Manzo needs a 2 mo follow up with Dr Ambriz (approx 8/15/18). When would you like for me to put him on the schedule?   Just let me know.  Thank you,  Yolette

## 2018-06-21 PROBLEM — I44.0 FIRST DEGREE AV BLOCK: Status: ACTIVE | Noted: 2018-06-21

## 2018-06-21 PROBLEM — R94.39 ABNORMAL STRESS ECHO: Status: RESOLVED | Noted: 2018-05-17 | Resolved: 2018-06-21

## 2018-07-05 ENCOUNTER — OFFICE VISIT (OUTPATIENT)
Dept: CARDIAC SURGERY | Facility: CLINIC | Age: 63
End: 2018-07-05

## 2018-07-05 ENCOUNTER — PATIENT MESSAGE (OUTPATIENT)
Dept: CARDIOLOGY | Facility: CLINIC | Age: 63
End: 2018-07-05

## 2018-07-05 ENCOUNTER — TELEPHONE (OUTPATIENT)
Dept: CARDIOLOGY | Facility: CLINIC | Age: 63
End: 2018-07-05

## 2018-07-05 VITALS
OXYGEN SATURATION: 96 % | HEIGHT: 72 IN | RESPIRATION RATE: 20 BRPM | BODY MASS INDEX: 31.15 KG/M2 | DIASTOLIC BLOOD PRESSURE: 79 MMHG | SYSTOLIC BLOOD PRESSURE: 117 MMHG | HEART RATE: 76 BPM | WEIGHT: 230 LBS | TEMPERATURE: 98 F

## 2018-07-05 DIAGNOSIS — Z95.1 S/P CABG X 3: Primary | ICD-10-CM

## 2018-07-05 PROCEDURE — 99024 POSTOP FOLLOW-UP VISIT: CPT | Performed by: NURSE PRACTITIONER

## 2018-07-05 NOTE — TELEPHONE ENCOUNTER
From: Mark Manzo  To: KATHIA Gama  Sent: 7/5/2018 10:10 AM EDT  Subject: Non-Urgent Medical Question    Still no word regarding cardio rehab. Anxious to get some idea of recovery path and timetable, like when I may be able to return to work, etc. Please any info would be appreciated. Thank you

## 2018-07-05 NOTE — TELEPHONE ENCOUNTER
I will have my medical assistant, Lucy contact the cardiac rehabilitation department.  Sorry for the delay.  The referral has been placed.  Thank you    ===View-only below this line===      ----- Message -----     From: Mark Manzo     Sent: 7/5/2018 10:10 AM EDT       To: KATHIA Howe  Subject: Non-Urgent Medical Question    Still no word regarding cardio rehab. Anxious to get some idea of recovery path and timetable, like when I may be able to return to work, etc. Please any info would be appreciated. Thank you

## 2018-07-05 NOTE — PROGRESS NOTES
"CARDIOVASCULAR SURGERY FOLLOW-UP PROGRESS NOTE  Chief Complaint: Post op follow-up        HPI:   Dear Dr. Malia Meza MD, MD and colleagues:    It was nice to see Mark Manzo in follow up 5 weeks after surgery.  As you know, he is a 62 y.o. male with CAD who underwent CABG ×3 with LIMA on 5/29/2018 with Dr. Ortega. He did well postoperatively and continues to do well, his left leg hematoma is continuing to resolve without intervention, there is no erythema or fluctuance noted on palpation. He comes in today complaining of incisional pain and bilateral chest wall pain.  His activity level has been good.  From a surgical standpoint, the sternal incision is well approximated without erythema, edema, or drainage.  The sternum is stable to palpation, and the patient denies any popping or clicking with deep inspiration or coughing.      Physical Exam:         /79 (Patient Position: Sitting, Cuff Size: Adult)   Pulse 76   Temp 98 °F (36.7 °C) (Oral)   Resp 20   Ht 182.9 cm (72\")   Wt 104 kg (230 lb)   SpO2 96%   BMI 31.19 kg/m²   Heart:  regular rate and rhythm, S1, S2 normal, no murmur, click, rub or gallop  Lungs:  clear to auscultation bilaterally  Extremities:  1+ lower extremity edema bilaterally  Incision(s):  mid chest healing well    Assessment/Plan:     S/P CABG. Overall, he is doing well.    No significant post-op complications    No heavy lifting > 10 pounds for 1 more weeks, nothing over 50 pounds and no percussive motions to the chest such as running or jumping until 3 months out from the date of surgery     OK to drive if not taking narcotic pain medicine  OK to begin cardiac rehab  Follow-up as scheduled with cardiology    Follow-up with CT surgery prn, is to return to office should he experience any signs or symptoms of infection or sternal instability    No restrictions of activity.      Thank you for allowing me to participate in the care of your patient.    Regards,  Mimi Joshi, " APRN

## 2018-07-06 ENCOUNTER — TRANSCRIBE ORDERS (OUTPATIENT)
Dept: CARDIAC REHAB | Facility: HOSPITAL | Age: 63
End: 2018-07-06

## 2018-07-06 DIAGNOSIS — Z95.1 S/P CABG X 3: Primary | ICD-10-CM

## 2018-07-06 NOTE — TELEPHONE ENCOUNTER
I called and s/w Chani in Cardiac Rehab.  She states she will call Mr. Manzo today to get him set up in rehab.  He can only do phase 3 due to insurance./[facility]    Cardiac Rehab# [phone]

## 2018-07-09 ENCOUNTER — OFFICE VISIT (OUTPATIENT)
Dept: CARDIAC REHAB | Facility: HOSPITAL | Age: 63
End: 2018-07-09

## 2018-07-09 DIAGNOSIS — Z95.1 S/P CABG X 3: Primary | ICD-10-CM

## 2018-07-11 ENCOUNTER — OFFICE VISIT (OUTPATIENT)
Dept: CARDIAC REHAB | Facility: HOSPITAL | Age: 63
End: 2018-07-11

## 2018-07-11 DIAGNOSIS — Z95.1 S/P CABG X 3: Primary | ICD-10-CM

## 2018-07-11 NOTE — PROGRESS NOTES
Initial Assessment on 7-9-2018. Patient will be back on 7- to be oriented to Phase III Cardiac Rehab.

## 2018-07-13 ENCOUNTER — OFFICE VISIT (OUTPATIENT)
Dept: CARDIAC REHAB | Facility: HOSPITAL | Age: 63
End: 2018-07-13

## 2018-07-13 DIAGNOSIS — Z95.1 S/P CABG X 3: Primary | ICD-10-CM

## 2018-07-16 ENCOUNTER — OFFICE VISIT (OUTPATIENT)
Dept: CARDIAC REHAB | Facility: HOSPITAL | Age: 63
End: 2018-07-16

## 2018-07-16 DIAGNOSIS — Z95.1 S/P CABG X 3: Primary | ICD-10-CM

## 2018-07-18 ENCOUNTER — OFFICE VISIT (OUTPATIENT)
Dept: CARDIAC REHAB | Facility: HOSPITAL | Age: 63
End: 2018-07-18

## 2018-07-18 DIAGNOSIS — Z95.1 S/P CABG X 3: Primary | ICD-10-CM

## 2018-07-20 ENCOUNTER — OFFICE VISIT (OUTPATIENT)
Dept: CARDIAC REHAB | Facility: HOSPITAL | Age: 63
End: 2018-07-20

## 2018-07-20 DIAGNOSIS — Z95.1 S/P CABG X 3: Primary | ICD-10-CM

## 2018-07-23 ENCOUNTER — OFFICE VISIT (OUTPATIENT)
Dept: CARDIAC REHAB | Facility: HOSPITAL | Age: 63
End: 2018-07-23

## 2018-07-23 DIAGNOSIS — Z95.1 S/P CABG X 3: Primary | ICD-10-CM

## 2018-07-25 ENCOUNTER — OFFICE VISIT (OUTPATIENT)
Dept: CARDIAC REHAB | Facility: HOSPITAL | Age: 63
End: 2018-07-25

## 2018-07-25 DIAGNOSIS — Z95.1 S/P CABG X 3: Primary | ICD-10-CM

## 2018-07-27 ENCOUNTER — OFFICE VISIT (OUTPATIENT)
Dept: CARDIAC REHAB | Facility: HOSPITAL | Age: 63
End: 2018-07-27

## 2018-07-27 DIAGNOSIS — Z95.1 S/P CABG X 3: Primary | ICD-10-CM

## 2018-07-30 ENCOUNTER — OFFICE VISIT (OUTPATIENT)
Dept: CARDIAC REHAB | Facility: HOSPITAL | Age: 63
End: 2018-07-30

## 2018-07-30 DIAGNOSIS — Z95.1 S/P CABG X 3: Primary | ICD-10-CM

## 2018-07-30 NOTE — PROGRESS NOTES
CARDIAC/PULMONARY REHAB NUTRITION EDUCATION/ASSESSMENT      63 y.o.         Height: 72  in    Weight: 230 lb,     BMI: 31.2 IBW:  180-200 lb.        Diet Survey Score: 53  Cardiac Risk Factors: Diabetes, HTN,Sleep Apnea, sedentary lifestyle Weight Assessment: Overweight  Weight Change:  Wt loss of 17 lb.    Desired Weight: less than 200 lb     Current Diet: Paleo, minimal poor quality carbohydrates  Appetite: good    Food records reviewed? Yes     Occupation:  [employer]  Job Activity Level:mild    Routine Exercise: mild      Mark lives alone and prepares most meal for himself                                                                                                                                                                                                                      Pertinent Lab Values:   Total: No components found for: CHOLESTEROL  HDL:   HDL Cholesterol   Date Value Ref Range Status   05/25/2018 49 40 - 60 mg/dL Final     LDL:  LDL Cholesterol    Date Value Ref Range Status   05/25/2018 190 (H) 0 - 100 mg/dL Final     Triglyceride: No components found for: TRIGLYCERIDE  Last HGBA1C with date if applicable:No components found for: A1C  Glucose:   Glucose   Date Value Ref Range Status   06/02/2018 126 (H) 65 - 99 mg/dL Final    Nutritional Supplements: N/A    Pertinent Nutrition-Related Medications:  Novolog, sliding scale, Novolin 15 units BID         Goals; Mark wants to have better control of Diabetes, lose weight and avoid health complications of CVD,     Assessment / Recommendations:We reviewed AHA, ADA, the DASH diet and choose your plate. We discussed meal planning strategies. Supportive written information was provided  Motivation level toward diet compliance: moderate         GOAl: Continue efforts to follow a high fiber, low saturated fat and sodium controlled diet                                                                                      5:39  PM  7/30/2018  Melissa Aguilar RD

## 2018-08-01 ENCOUNTER — OFFICE VISIT (OUTPATIENT)
Dept: CARDIAC REHAB | Facility: HOSPITAL | Age: 63
End: 2018-08-01

## 2018-08-01 DIAGNOSIS — Z95.1 S/P CABG X 3: Primary | ICD-10-CM

## 2018-08-03 ENCOUNTER — OFFICE VISIT (OUTPATIENT)
Dept: CARDIAC REHAB | Facility: HOSPITAL | Age: 63
End: 2018-08-03

## 2018-08-03 DIAGNOSIS — Z95.1 S/P CABG X 3: Primary | ICD-10-CM

## 2018-08-06 ENCOUNTER — OFFICE VISIT (OUTPATIENT)
Dept: CARDIAC REHAB | Facility: HOSPITAL | Age: 63
End: 2018-08-06

## 2018-08-06 DIAGNOSIS — Z95.1 S/P CABG X 3: Primary | ICD-10-CM

## 2018-08-08 ENCOUNTER — OFFICE VISIT (OUTPATIENT)
Dept: CARDIAC REHAB | Facility: HOSPITAL | Age: 63
End: 2018-08-08

## 2018-08-08 DIAGNOSIS — Z95.1 S/P CABG X 3: Primary | ICD-10-CM

## 2018-08-13 ENCOUNTER — OFFICE VISIT (OUTPATIENT)
Dept: CARDIAC REHAB | Facility: HOSPITAL | Age: 63
End: 2018-08-13

## 2018-08-13 DIAGNOSIS — Z95.1 S/P CABG X 3: Primary | ICD-10-CM

## 2018-08-15 ENCOUNTER — OFFICE VISIT (OUTPATIENT)
Dept: CARDIAC REHAB | Facility: HOSPITAL | Age: 63
End: 2018-08-15

## 2018-08-15 DIAGNOSIS — Z95.1 S/P CABG X 3: Primary | ICD-10-CM

## 2018-08-16 ENCOUNTER — LAB (OUTPATIENT)
Dept: LAB | Facility: HOSPITAL | Age: 63
End: 2018-08-16

## 2018-08-16 ENCOUNTER — TELEPHONE (OUTPATIENT)
Dept: CARDIOLOGY | Facility: CLINIC | Age: 63
End: 2018-08-16

## 2018-08-16 ENCOUNTER — OFFICE VISIT (OUTPATIENT)
Dept: CARDIOLOGY | Facility: CLINIC | Age: 63
End: 2018-08-16

## 2018-08-16 VITALS
HEIGHT: 70 IN | HEART RATE: 74 BPM | SYSTOLIC BLOOD PRESSURE: 130 MMHG | WEIGHT: 236.4 LBS | BODY MASS INDEX: 33.84 KG/M2 | DIASTOLIC BLOOD PRESSURE: 82 MMHG

## 2018-08-16 DIAGNOSIS — E11.9 TYPE 2 DIABETES MELLITUS WITHOUT COMPLICATION, WITH LONG-TERM CURRENT USE OF INSULIN (HCC): ICD-10-CM

## 2018-08-16 DIAGNOSIS — E78.5 HYPERLIPIDEMIA, UNSPECIFIED HYPERLIPIDEMIA TYPE: ICD-10-CM

## 2018-08-16 DIAGNOSIS — Z95.1 S/P CABG X 3: ICD-10-CM

## 2018-08-16 DIAGNOSIS — I25.10 CORONARY ARTERY DISEASE INVOLVING NATIVE CORONARY ARTERY OF NATIVE HEART WITHOUT ANGINA PECTORIS: ICD-10-CM

## 2018-08-16 DIAGNOSIS — Z79.4 TYPE 2 DIABETES MELLITUS WITHOUT COMPLICATION, WITH LONG-TERM CURRENT USE OF INSULIN (HCC): ICD-10-CM

## 2018-08-16 DIAGNOSIS — IMO0001 CLASS 2 OBESITY DUE TO EXCESS CALORIES WITH SERIOUS COMORBIDITY AND BODY MASS INDEX (BMI) OF 37.0 TO 37.9 IN ADULT: ICD-10-CM

## 2018-08-16 DIAGNOSIS — I25.10 CORONARY ARTERY DISEASE INVOLVING NATIVE CORONARY ARTERY OF NATIVE HEART WITHOUT ANGINA PECTORIS: Primary | ICD-10-CM

## 2018-08-16 DIAGNOSIS — I10 ESSENTIAL HYPERTENSION: ICD-10-CM

## 2018-08-16 LAB
ALBUMIN SERPL-MCNC: 4 G/DL (ref 3.5–5.2)
ALBUMIN/GLOB SERPL: 1.3 G/DL
ALP SERPL-CCNC: 63 U/L (ref 39–117)
ALT SERPL W P-5'-P-CCNC: 19 U/L (ref 1–41)
ANION GAP SERPL CALCULATED.3IONS-SCNC: 12.8 MMOL/L
AST SERPL-CCNC: 19 U/L (ref 1–40)
BILIRUB SERPL-MCNC: 0.5 MG/DL (ref 0.1–1.2)
BUN BLD-MCNC: 22 MG/DL (ref 8–23)
BUN/CREAT SERPL: 20.6 (ref 7–25)
CALCIUM SPEC-SCNC: 9.7 MG/DL (ref 8.6–10.5)
CHLORIDE SERPL-SCNC: 104 MMOL/L (ref 98–107)
CHOLEST SERPL-MCNC: 237 MG/DL (ref 0–200)
CO2 SERPL-SCNC: 23.2 MMOL/L (ref 22–29)
CREAT BLD-MCNC: 1.07 MG/DL (ref 0.76–1.27)
GFR SERPL CREATININE-BSD FRML MDRD: 70 ML/MIN/1.73
GLOBULIN UR ELPH-MCNC: 3.2 GM/DL
GLUCOSE BLD-MCNC: 114 MG/DL (ref 65–99)
HDLC SERPL-MCNC: 49 MG/DL (ref 40–60)
LDLC SERPL CALC-MCNC: 176 MG/DL (ref 0–100)
LDLC/HDLC SERPL: 3.6 {RATIO}
POTASSIUM BLD-SCNC: 4.5 MMOL/L (ref 3.5–5.2)
PROT SERPL-MCNC: 7.2 G/DL (ref 6–8.5)
SODIUM BLD-SCNC: 140 MMOL/L (ref 136–145)
TRIGL SERPL-MCNC: 59 MG/DL (ref 0–150)
VLDLC SERPL-MCNC: 11.8 MG/DL (ref 5–40)

## 2018-08-16 PROCEDURE — 80061 LIPID PANEL: CPT

## 2018-08-16 PROCEDURE — 36415 COLL VENOUS BLD VENIPUNCTURE: CPT | Performed by: INTERNAL MEDICINE

## 2018-08-16 PROCEDURE — 80053 COMPREHEN METABOLIC PANEL: CPT | Performed by: INTERNAL MEDICINE

## 2018-08-16 PROCEDURE — 99214 OFFICE O/P EST MOD 30 MIN: CPT | Performed by: INTERNAL MEDICINE

## 2018-08-16 PROCEDURE — 93000 ELECTROCARDIOGRAM COMPLETE: CPT | Performed by: INTERNAL MEDICINE

## 2018-08-16 RX ORDER — LISINOPRIL 10 MG/1
10 TABLET ORAL DAILY
Qty: 90 TABLET | Refills: 3 | Status: SHIPPED | OUTPATIENT
Start: 2018-08-16 | End: 2019-07-29 | Stop reason: SDUPTHER

## 2018-08-16 NOTE — PROGRESS NOTES
Date of Office Visit: 2018  Encounter Provider: Suzi Ambriz MD  Place of Service: [facility] CARDIOLOGY  Patient Name: Mark Manzo  :1955      Patient ID:  Mark Manzo is a 63 y.o. male is here for  followup for CAD, s/p CABG.        History of Present Illness    He has a known history of diabetes mellitus type 2, hyperlipidemia, obesity and hypertension.  His last hemoglobin A1c was 2017, 5.7.  He is on insulin and metformin.  He has observed sleep apnea and uses a CPAP machine.  He has osteoarthritis and needs a knee replacement.     He is  and lives alone and has 2 children.  He is a .  He has not had alcohol in 24 years.  He smoking 24 years ago.  He has 3 cups of coffee per day and 2 glasses of iced tea.  In his family, his father heart disease and strokes.  His mother and father both had hypertension.He has history of alcohol dependence which is in remission     He had a stress echocardiogram on 18 which was abnormal showing mild LVH, grade 1a diastolic dysfunction, LVEF at rest of 60% and abnormal stress study showing apical hypokinesis with exercise, consistent with ischemia.  He had no cp but did have dyspnea with exertion.     He then underwent cardiac catheterization on 18 which showed severe three-vessel coronary artery disease and was referred for coronary artery bypass grafting which was completed on 18 by Dr. Junior Ortega (LIMA to LAD, SVG to OM1, and SVG to diagonal 2).  He was noted to have a normal ejection fraction.     Has no chest pain or difficulty breathing.  He's had no syncope.  He's exercising and feels well.  He changed his diet.  He's had no tachycardia.  He has no orthopnea or PND.  He is tolerating his medications well.  He cannot take statins and so he is on cholestyramine.    He had carotid duplex study done  showing mild right internal carotid artery stenosis, left internal carotid  artery plaque but no stenosis.    Past Medical History:   Diagnosis Date   • Acute pain of right knee     H/O   • Arthritis    • Body aches    • Carotid artery stenosis 05/25/2018    Mid Right Internal Carotid Artery Mild Stenosis; L Internal Artery Plaque Noted Per Duplex 5/25/2018   • Chronic left hip pain    • Coronary artery disease    • Decreased energy    • Depression     Hx   • Diabetes mellitus (CMS/[facility])     2   • Fatigue    • First degree AV block 6/21/2018   • History of allergic rhinitis    • History of left hip replacement    • Hyperlipidemia    • Hypertension    • Joint pain    • Sleep apnea     Uses CPAP   • Swelling    • Weight gain          Past Surgical History:   Procedure Laterality Date   • CARDIAC CATHETERIZATION N/A 5/23/2018    Dr. Elie Sarmiento.  Left main normal, LAD long 90% proximal and mid LAD diffuse 20% disease, first diagonal 90% lesion, second diagonal 90% lesion, OM1 90% proximal lesion, 30% distal disease PDA, 100% occluded RCA   • CARDIAC CATHETERIZATION N/A 5/23/2018    Procedure: Left Heart Cath;  Surgeon: Charles Sarmiento MD;  Location: [facility] CATH INVASIVE LOCATION;  Service: Cardiovascular   • CARDIAC CATHETERIZATION N/A 5/23/2018    Procedure: Left ventriculography;  Surgeon: Charles Sarmiento MD;  Location: [facility] CATH INVASIVE LOCATION;  Service: Cardiovascular   • COLONOSCOPY     • CORONARY ARTERY BYPASS GRAFT N/A 5/29/2018    Procedure: SAGE STERNOTOMY CORONARY ARTERY BYPASS GRAFT TIMES 3 USING LEFT INTERNAL MAMMARY ARTERY AND LEFT GREATER SAPHENOUS VEIN GRAFT PER ENDOSCOPIC VEIN HARVESTING AND PRP;  Surgeon: Junior Ortega MD;  Location: [facility] OR;  Service: Cardiothoracic   • OTHER SURGICAL HISTORY      Rectal Surgery Of Perirectal Fistula   • TONSILLECTOMY     • TOTAL HIP ARTHROPLASTY Left    • VASECTOMY         Current Outpatient Prescriptions on File Prior to Visit   Medication Sig Dispense Refill   • aspirin 81 MG chewable tablet Chew 81 mg Daily. PT TO FOLLOW MD  INSTRUCTIONS ON STOP DATE FOR SX     • insulin aspart (novoLOG) 100 UNIT/ML injection Inject  under the skin 2 (Two) Times a Day. SLIDING  SCALE AFTER TESTING 2 X DAY-PT ONLY TAKES IF GLUCOSE > 150-RARELY HAS TO TAKE     • insulin NPH (humuLIN N,novoLIN N) 100 UNIT/ML injection Inject 15 Units under the skin 2 (Two) Times a Day Before Meals.     • metFORMIN (GLUCOPHAGE) 500 MG tablet Take 1 tablet by mouth 2 (Two) Times a Day With Meals.     • metoprolol tartrate (LOPRESSOR) 25 MG tablet Take 1 tablet by mouth 2 (Two) Times a Day. 60 tablet 2   • Multiple Vitamins-Minerals (MENS MULTIVITAMIN PLUS PO) Take 1 tablet by mouth Daily. ON HOLD FOR SX     • ONE TOUCH ULTRA TEST test strip      • ONETOUCH DELICA LANCETS 33G misc        No current facility-administered medications on file prior to visit.        Social History     Social History   • Marital status:      Spouse name: N/A   • Number of children: N/A   • Years of education: N/A     Occupational History   • Not on file.     Social History Main Topics   • Smoking status: Former Smoker     Types: Cigarettes     Quit date: 5/25/1993   • Smokeless tobacco: Never Used   • Alcohol use Not on file      Comment: Hx Alcoholic-Sober x 24+ Yrs   • Drug use: No      Comment: Sober x 24+ Yrs   • Sexual activity: Defer      Comment: Vasectomy     Other Topics Concern   • Not on file     Social History Narrative   • No narrative on file           Review of Systems   Constitution: Negative.   HENT: Negative for congestion.    Eyes: Negative for vision loss in left eye and vision loss in right eye.   Respiratory: Negative.  Negative for cough, hemoptysis, shortness of breath, sleep disturbances due to breathing, snoring, sputum production and wheezing.    Endocrine: Negative.    Hematologic/Lymphatic: Negative.    Skin: Negative for poor wound healing and rash.   Musculoskeletal: Negative for falls, gout, muscle cramps and myalgias.   Gastrointestinal: Negative for  "abdominal pain, diarrhea, dysphagia, hematemesis, melena, nausea and vomiting.   Neurological: Negative for excessive daytime sleepiness, dizziness, headaches, light-headedness, loss of balance, seizures and vertigo.   Psychiatric/Behavioral: Negative for depression and substance abuse. The patient is not nervous/anxious.        Procedures    ECG 12 Lead  Date/Time: 8/16/2018 12:30 PM  Performed by: OREN FIELDS  Authorized by: OREN FIELDS   Comparison: compared with previous ECG   Similar to previous ECG  Rhythm: sinus rhythm  Conduction: 1st degree  Clinical impression: non-specific ECG                Objective:      Vitals:    08/16/18 1201   BP: 130/82   BP Location: Left arm   Patient Position: Sitting   Pulse: 74   Weight: 107 kg (236 lb 6.4 oz)   Height: 177.8 cm (70\")     Body mass index is 33.92 kg/m².    Physical Exam   Constitutional: He is oriented to person, place, and time. He appears well-developed and well-nourished. No distress.   HENT:   Head: Normocephalic and atraumatic.   Eyes: Conjunctivae are normal. No scleral icterus.   Neck: Neck supple. No JVD present. Carotid bruit is not present. No thyromegaly present.   Cardiovascular: Normal rate, regular rhythm, S1 normal, S2 normal, normal heart sounds and intact distal pulses.   No extrasystoles are present. PMI is not displaced.  Exam reveals no gallop.    No murmur heard.  Pulses:       Carotid pulses are 2+ on the right side, and 2+ on the left side.       Radial pulses are 2+ on the right side, and 2+ on the left side.        Dorsalis pedis pulses are 2+ on the right side, and 2+ on the left side.        Posterior tibial pulses are 2+ on the right side, and 2+ on the left side.   Pulmonary/Chest: Effort normal and breath sounds normal. No respiratory distress. He has no wheezes. He has no rhonchi. He has no rales. He exhibits no tenderness.   Abdominal: Soft. Bowel sounds are normal. He exhibits no distension, no abdominal " bruit and no mass. There is no tenderness.   Musculoskeletal: He exhibits no edema or deformity.   Lymphadenopathy:     He has no cervical adenopathy.   Neurological: He is alert and oriented to person, place, and time. No cranial nerve deficit.   Skin: Skin is warm and dry. No rash noted. He is not diaphoretic. No cyanosis. No pallor. Nails show no clubbing.   Psychiatric: He has a normal mood and affect. Judgment normal.   Vitals reviewed.      Lab Review:       Assessment:      Diagnosis Plan   1. Coronary artery disease involving native coronary artery of native heart without angina pectoris     2. Essential hypertension     3. Hyperlipidemia, unspecified hyperlipidemia type     4. S/P CABG x 3     5. Type 2 diabetes mellitus without complication, with long-term current use of insulin (CMS/[facility])     6. Class 2 obesity due to excess calories with serious comorbidity and body mass index (BMI) of 37.0 to 37.9 in adult       1. CAD, s/p CABG 5/2018, no angina or chf.   2. Normal LVEF.  3. Hypertension, add lisinopril 10mg daily.   4. Hyperlipidemia, on cholestyramine.   5. DM, type 2.   6. Obesity.      Plan:       See ramin in 3 months, check lipids and cmp today.     Coronary Artery Disease  Assessment  • The patient has no angina    Subjective - Objective  • There is a history of previous coronary artery bypass graft  • Current antiplatelet therapy includes aspirin 81 mg

## 2018-08-16 NOTE — TELEPHONE ENCOUNTER
----- Message from Suzi Ambriz MD sent at 8/16/2018  2:26 PM EDT -----  pls let him know that his lipids are too high - can we get him qualified for praluent 75mg every 2 weeks?  See if he would be willing to try this.

## 2018-08-22 ENCOUNTER — OFFICE VISIT (OUTPATIENT)
Dept: CARDIAC REHAB | Facility: HOSPITAL | Age: 63
End: 2018-08-22

## 2018-08-22 DIAGNOSIS — Z95.1 S/P CABG X 3: Primary | ICD-10-CM

## 2018-08-23 NOTE — TELEPHONE ENCOUNTER
Total Cholesterol 0 - 200 mg/dL 237     Triglycerides 0 - 150 mg/dL 59    HDL Cholesterol 40 - 60 mg/dL 49    LDL Cholesterol  0 - 100 mg/dL 176     VLDL Cholesterol 5 - 40 mg/dL 11.8    LDL/HDL Ratio  3.60      Result from 8-16-18    Result from 5-25-18     Total Cholesterol 0 - 200 mg/dL 266     Triglycerides 0 - 150 mg/dL 134    HDL Cholesterol 40 - 60 mg/dL 49    LDL Cholesterol  0 - 100 mg/dL 190     VLDL Cholesterol 5 - 40 mg/dL 26.8      Pt states thet his lipid went down from the last time. Pt wants to try and stay on cholestyramine and do diet and exercise.    Pt wants to talk to you  about his last ECG. He saw it on [facility] and read abnormal    Pt can be reached at [phone]      Kaykay BARCLAY

## 2018-08-24 ENCOUNTER — OFFICE VISIT (OUTPATIENT)
Dept: CARDIAC REHAB | Facility: HOSPITAL | Age: 63
End: 2018-08-24

## 2018-08-24 DIAGNOSIS — Z95.1 S/P CABG X 3: Primary | ICD-10-CM

## 2018-08-29 ENCOUNTER — OFFICE VISIT (OUTPATIENT)
Dept: CARDIAC REHAB | Facility: HOSPITAL | Age: 63
End: 2018-08-29

## 2018-08-29 DIAGNOSIS — Z95.1 S/P CABG X 3: Primary | ICD-10-CM

## 2018-10-28 DIAGNOSIS — I44.0 FIRST DEGREE AV BLOCK: ICD-10-CM

## 2018-10-28 DIAGNOSIS — I25.10 CORONARY ARTERY DISEASE INVOLVING NATIVE CORONARY ARTERY OF NATIVE HEART WITHOUT ANGINA PECTORIS: ICD-10-CM

## 2018-10-29 ENCOUNTER — APPOINTMENT (OUTPATIENT)
Dept: CARDIAC REHAB | Facility: HOSPITAL | Age: 63
End: 2018-10-29

## 2018-10-31 ENCOUNTER — APPOINTMENT (OUTPATIENT)
Dept: CARDIAC REHAB | Facility: HOSPITAL | Age: 63
End: 2018-10-31

## 2018-11-02 ENCOUNTER — APPOINTMENT (OUTPATIENT)
Dept: CARDIAC REHAB | Facility: HOSPITAL | Age: 63
End: 2018-11-02

## 2018-11-05 ENCOUNTER — APPOINTMENT (OUTPATIENT)
Dept: CARDIAC REHAB | Facility: HOSPITAL | Age: 63
End: 2018-11-05

## 2018-11-07 ENCOUNTER — APPOINTMENT (OUTPATIENT)
Dept: CARDIAC REHAB | Facility: HOSPITAL | Age: 63
End: 2018-11-07

## 2018-11-09 ENCOUNTER — APPOINTMENT (OUTPATIENT)
Dept: CARDIAC REHAB | Facility: HOSPITAL | Age: 63
End: 2018-11-09

## 2018-11-12 ENCOUNTER — APPOINTMENT (OUTPATIENT)
Dept: CARDIAC REHAB | Facility: HOSPITAL | Age: 63
End: 2018-11-12

## 2018-11-14 ENCOUNTER — APPOINTMENT (OUTPATIENT)
Dept: CARDIAC REHAB | Facility: HOSPITAL | Age: 63
End: 2018-11-14

## 2018-11-16 ENCOUNTER — APPOINTMENT (OUTPATIENT)
Dept: CARDIAC REHAB | Facility: HOSPITAL | Age: 63
End: 2018-11-16

## 2018-11-19 ENCOUNTER — APPOINTMENT (OUTPATIENT)
Dept: CARDIAC REHAB | Facility: HOSPITAL | Age: 63
End: 2018-11-19

## 2018-11-21 ENCOUNTER — APPOINTMENT (OUTPATIENT)
Dept: CARDIAC REHAB | Facility: HOSPITAL | Age: 63
End: 2018-11-21

## 2018-11-23 ENCOUNTER — APPOINTMENT (OUTPATIENT)
Dept: CARDIAC REHAB | Facility: HOSPITAL | Age: 63
End: 2018-11-23

## 2018-11-26 ENCOUNTER — APPOINTMENT (OUTPATIENT)
Dept: CARDIAC REHAB | Facility: HOSPITAL | Age: 63
End: 2018-11-26

## 2018-11-28 ENCOUNTER — APPOINTMENT (OUTPATIENT)
Dept: CARDIAC REHAB | Facility: HOSPITAL | Age: 63
End: 2018-11-28

## 2018-11-30 ENCOUNTER — APPOINTMENT (OUTPATIENT)
Dept: CARDIAC REHAB | Facility: HOSPITAL | Age: 63
End: 2018-11-30

## 2018-12-03 ENCOUNTER — APPOINTMENT (OUTPATIENT)
Dept: CARDIAC REHAB | Facility: HOSPITAL | Age: 63
End: 2018-12-03

## 2018-12-05 ENCOUNTER — APPOINTMENT (OUTPATIENT)
Dept: CARDIAC REHAB | Facility: HOSPITAL | Age: 63
End: 2018-12-05

## 2018-12-07 ENCOUNTER — APPOINTMENT (OUTPATIENT)
Dept: CARDIAC REHAB | Facility: HOSPITAL | Age: 63
End: 2018-12-07

## 2018-12-10 ENCOUNTER — APPOINTMENT (OUTPATIENT)
Dept: CARDIAC REHAB | Facility: HOSPITAL | Age: 63
End: 2018-12-10

## 2018-12-12 ENCOUNTER — APPOINTMENT (OUTPATIENT)
Dept: CARDIAC REHAB | Facility: HOSPITAL | Age: 63
End: 2018-12-12

## 2018-12-14 ENCOUNTER — APPOINTMENT (OUTPATIENT)
Dept: CARDIAC REHAB | Facility: HOSPITAL | Age: 63
End: 2018-12-14

## 2018-12-17 ENCOUNTER — APPOINTMENT (OUTPATIENT)
Dept: CARDIAC REHAB | Facility: HOSPITAL | Age: 63
End: 2018-12-17

## 2018-12-19 ENCOUNTER — APPOINTMENT (OUTPATIENT)
Dept: CARDIAC REHAB | Facility: HOSPITAL | Age: 63
End: 2018-12-19

## 2018-12-21 ENCOUNTER — APPOINTMENT (OUTPATIENT)
Dept: CARDIAC REHAB | Facility: HOSPITAL | Age: 63
End: 2018-12-21

## 2018-12-24 ENCOUNTER — APPOINTMENT (OUTPATIENT)
Dept: CARDIAC REHAB | Facility: HOSPITAL | Age: 63
End: 2018-12-24

## 2018-12-26 ENCOUNTER — APPOINTMENT (OUTPATIENT)
Dept: CARDIAC REHAB | Facility: HOSPITAL | Age: 63
End: 2018-12-26

## 2018-12-28 ENCOUNTER — APPOINTMENT (OUTPATIENT)
Dept: CARDIAC REHAB | Facility: HOSPITAL | Age: 63
End: 2018-12-28

## 2018-12-31 ENCOUNTER — APPOINTMENT (OUTPATIENT)
Dept: CARDIAC REHAB | Facility: HOSPITAL | Age: 63
End: 2018-12-31

## 2019-01-02 ENCOUNTER — APPOINTMENT (OUTPATIENT)
Dept: CARDIAC REHAB | Facility: HOSPITAL | Age: 64
End: 2019-01-02

## 2019-01-04 ENCOUNTER — APPOINTMENT (OUTPATIENT)
Dept: CARDIAC REHAB | Facility: HOSPITAL | Age: 64
End: 2019-01-04

## 2019-01-05 DIAGNOSIS — I44.0 FIRST DEGREE AV BLOCK: ICD-10-CM

## 2019-01-05 DIAGNOSIS — I25.10 CORONARY ARTERY DISEASE INVOLVING NATIVE CORONARY ARTERY OF NATIVE HEART WITHOUT ANGINA PECTORIS: ICD-10-CM

## 2019-02-06 DIAGNOSIS — I25.10 CORONARY ARTERY DISEASE INVOLVING NATIVE CORONARY ARTERY OF NATIVE HEART WITHOUT ANGINA PECTORIS: ICD-10-CM

## 2019-02-06 DIAGNOSIS — I44.0 FIRST DEGREE AV BLOCK: ICD-10-CM

## 2019-05-13 ENCOUNTER — APPOINTMENT (OUTPATIENT)
Dept: PHYSICAL THERAPY | Facility: HOSPITAL | Age: 64
End: 2019-05-13

## 2019-05-17 ENCOUNTER — APPOINTMENT (OUTPATIENT)
Dept: PHYSICAL THERAPY | Facility: HOSPITAL | Age: 64
End: 2019-05-17

## 2019-08-02 RX ORDER — LISINOPRIL 10 MG/1
TABLET ORAL
Qty: 90 TABLET | Refills: 0 | Status: SHIPPED | OUTPATIENT
Start: 2019-08-02 | End: 2019-12-01 | Stop reason: SDUPTHER

## 2019-08-06 ENCOUNTER — PATIENT MESSAGE (OUTPATIENT)
Dept: CARDIOLOGY | Facility: CLINIC | Age: 64
End: 2019-08-06

## 2019-08-06 NOTE — TELEPHONE ENCOUNTER
From: Mark Manzo  To: Suzi Ambriz MD  Sent: 8/6/2019 3:00 PM EDT  Subject: Prescription Question    Dr. Ambriz prescribed Lisinopril 10mg 1 x day and Hanna Kent prescribed Metoprolol 25mg 2 x day.  I have no refills on either prescription. Dr. Ambriz told me that I didn't need to see her again unless I started having issues. When I tried to refill these meds, I got a message scheduling an appointment with Velma Cardona on 8/19 at 11A. My question is: May I have my PCP prescribe lisinopril and metoprolol? Or may I get them refilled without an office visit? I don't mind seeing you except it costs $45 for the co-pay versus $5 at my PCP.  By the way I am doing well since my triple by-pass May 29th, 2018. I got  Oct. 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 Thank you

## 2019-08-07 DIAGNOSIS — I25.10 CORONARY ARTERY DISEASE INVOLVING NATIVE CORONARY ARTERY OF NATIVE HEART WITHOUT ANGINA PECTORIS: ICD-10-CM

## 2019-08-07 DIAGNOSIS — I44.0 FIRST DEGREE AV BLOCK: ICD-10-CM

## 2019-09-18 ENCOUNTER — OFFICE VISIT (OUTPATIENT)
Dept: CARDIOLOGY | Facility: CLINIC | Age: 64
End: 2019-09-18

## 2019-09-18 VITALS
DIASTOLIC BLOOD PRESSURE: 82 MMHG | HEIGHT: 70 IN | WEIGHT: 260.8 LBS | SYSTOLIC BLOOD PRESSURE: 122 MMHG | HEART RATE: 71 BPM | BODY MASS INDEX: 37.34 KG/M2

## 2019-09-18 DIAGNOSIS — E78.5 HYPERLIPIDEMIA, UNSPECIFIED HYPERLIPIDEMIA TYPE: ICD-10-CM

## 2019-09-18 DIAGNOSIS — E11.9 TYPE 2 DIABETES MELLITUS WITHOUT COMPLICATION, WITH LONG-TERM CURRENT USE OF INSULIN (HCC): ICD-10-CM

## 2019-09-18 DIAGNOSIS — G47.33 OSA (OBSTRUCTIVE SLEEP APNEA): ICD-10-CM

## 2019-09-18 DIAGNOSIS — I10 ESSENTIAL HYPERTENSION: Primary | ICD-10-CM

## 2019-09-18 DIAGNOSIS — I25.10 CORONARY ARTERY DISEASE INVOLVING NATIVE CORONARY ARTERY OF NATIVE HEART WITHOUT ANGINA PECTORIS: ICD-10-CM

## 2019-09-18 DIAGNOSIS — Z79.4 TYPE 2 DIABETES MELLITUS WITHOUT COMPLICATION, WITH LONG-TERM CURRENT USE OF INSULIN (HCC): ICD-10-CM

## 2019-09-18 PROCEDURE — 93000 ELECTROCARDIOGRAM COMPLETE: CPT | Performed by: NURSE PRACTITIONER

## 2019-09-18 PROCEDURE — 99214 OFFICE O/P EST MOD 30 MIN: CPT | Performed by: NURSE PRACTITIONER

## 2019-09-18 RX ORDER — AMOXICILLIN 500 MG/1
CAPSULE ORAL
Refills: 5 | COMMUNITY
Start: 2019-08-13 | End: 2022-01-24

## 2019-09-18 RX ORDER — CHOLESTYRAMINE 4 G/9G
1 POWDER, FOR SUSPENSION ORAL DAILY
COMMUNITY
End: 2020-10-09

## 2019-09-18 RX ORDER — METOPROLOL TARTRATE AND HYDROCHLOROTHIAZIDE 100; 25 MG/1; MG/1
TABLET ORAL
COMMUNITY
End: 2019-09-18 | Stop reason: ALTCHOICE

## 2019-09-18 NOTE — PROGRESS NOTES
Subjective:     Encounter Date:09/18/2019      Patient ID: Mark Manzo is a 64 y.o. male.    Chief Complaint: annual cardiac evaluation  History of Present Illness     He is a new patient to me and I reviewed his past medical records.  He is a patient of Dr. Ambriz.  He has a history of known CAD status post CABG.  He has a history of diabetes type 2 and is on insulin.  He also has a history of hyperlipidemia, obesity, and hypertension.  He has obstructive sleep apnea and is compliant with CPAP machine.  He has osteoarthritis of his knee.     He is  and lives alone and has 2 children.  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  He has not had alcohol in 24 years.  He smoking 24 years ago.  He has 3 cups of coffee per day and 2 glasses of iced tea.  In his family, his father heart disease and strokes.  His mother and father both had hypertension.He has history of alcohol dependence which is in remission     He had a stress echocardiogram on 5/7/18 which was abnormal showing mild LVH, grade 1a diastolic dysfunction, LVEF at rest of 60% and abnormal stress study showing apical hypokinesis with exercise, consistent with ischemia.  He had no cp but did have dyspnea with exertion.      He then underwent cardiac catheterization on 5/23/18 which showed severe three-vessel coronary artery disease and was referred for coronary artery bypass grafting which was completed on 5/29/18 by Dr. Junior Ortega (LIMA to LAD, SVG to OM1, and SVG to diagonal 2).  He was noted to have a normal ejection fraction.       He cannot take statins and so he is on cholestyramine.     He had carotid duplex study done 5/55/18 showing mild right internal carotid artery stenosis, left internal carotid artery plaque but no stenosis.     He presents today, 9/18/2019, for his annual cardiac evaluation.  He has had a good year.  He denies any palpitations, shortness of breath, chest pain or chest  tightness.  He denies any lightheadedness or dizziness.  He has no fatigue.  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  He states sometimes after sitting for the day he will have some lower extremity edema that resolves overnight.      The following portions of the patient's history were reviewed and updated as appropriate: allergies, current medications, past family history, past medical history, past social history, past surgical history and problem list.    Review of Systems   Constitution: Negative for weakness and malaise/fatigue.   HENT: Negative for congestion, hoarse voice and sore throat.    Eyes: Negative for blurred vision, double vision, photophobia, vision loss in left eye and vision loss in right eye.   Cardiovascular: Negative for chest pain, dyspnea on exertion, irregular heartbeat, leg swelling, near-syncope, orthopnea, palpitations, paroxysmal nocturnal dyspnea and syncope.   Respiratory: Negative for cough, hemoptysis, shortness of breath, sleep disturbances due to breathing, snoring, sputum production and wheezing.    Endocrine: Negative.    Hematologic/Lymphatic: Does not bruise/bleed easily.   Skin: Negative for color change, dry skin, poor wound healing and rash.   Musculoskeletal: Negative for back pain, falls, gout, joint pain, joint swelling, muscle cramps and muscle weakness.   Gastrointestinal: Negative for abdominal pain, constipation, diarrhea, dysphagia, melena, nausea and vomiting.   Neurological: Negative for excessive daytime sleepiness, dizziness, headaches, light-headedness, loss of balance, numbness, paresthesias, seizures and vertigo.   Psychiatric/Behavioral: Negative for depression and substance abuse. The patient is not nervous/anxious.        ECG 12 Lead  Date/Time: 9/18/2019 10:36 AM  Performed by: Velma Cardona APRN  Authorized by: Velma Cardona APRN   Comparison: compared with previous ECG from 8/16/2019  Similar to  previous ECG  Rhythm: sinus rhythm  Rate: normal  Conduction: 1st degree AV block  ST Segments: ST segments normal  T Waves: T waves normal  QRS axis: normal    Clinical impression: non-specific ECG               Objective:         Physical Exam   Constitutional: He is oriented to person, place, and time. Vital signs are normal. He appears well-developed and well-nourished. No distress.   HENT:   Head: Normocephalic and atraumatic.   Right Ear: Hearing normal.   Left Ear: Hearing normal.   Eyes: Conjunctivae and lids are normal.   Neck: Normal range of motion. Neck supple. No JVD present. Carotid bruit is not present. No thyromegaly present.   Cardiovascular: Normal rate, regular rhythm, S1 normal, S2 normal, normal heart sounds and intact distal pulses.  PMI is not displaced.  Exam reveals no gallop.    No murmur heard.  Pulses:       Carotid pulses are 2+ on the right side, and 2+ on the left side.       Radial pulses are 2+ on the right side, and 2+ on the left side.        Dorsalis pedis pulses are 2+ on the right side, and 2+ on the left side.        Posterior tibial pulses are 2+ on the right side, and 2+ on the left side.   Pulmonary/Chest: Effort normal and breath sounds normal. No respiratory distress. He has no wheezes. He has no rhonchi. He has no rales. He exhibits no tenderness.   Abdominal: Soft. Normal appearance and bowel sounds are normal. He exhibits no distension, no abdominal bruit and no mass. There is no tenderness.   Musculoskeletal: Normal range of motion.   Exhibits no edema or deformity   Lymphadenopathy:     He has no cervical adenopathy.   Neurological: He is alert and oriented to person, place, and time. No cranial nerve deficit. Coordination and gait normal.   Oriented to person, place and time.   Skin: Skin is warm, dry and intact. No rash noted. He is not diaphoretic. No cyanosis. Nails show no clubbing.   Psychiatric: He has a normal mood and affect. His speech is normal and behavior  "is normal. Judgment and thought content normal. Cognition and memory are normal.     Vitals:    09/18/19 1012   BP: 122/82   Pulse: 71   Weight: 118 kg (260 lb 12.8 oz)   Height: 177.8 cm (70\")           Lab Review:       Assessment:          Diagnosis Plan   1. Essential hypertension     2. Hyperlipidemia, unspecified hyperlipidemia type     3. Coronary artery disease involving native coronary artery of native heart without angina pectoris     4. VANDANA (obstructive sleep apnea)     5. Type 2 diabetes mellitus without complication, with long-term current use of insulin (CMS/[facility])            Plan:       1.  Essential hypertension-well controlled    2.  Hyperlipidemia-he is followed by primary care physicians at [facility].  He is intolerant to statins.  He is currently taking cholestyramine.  He states he has had his lipids checked within the last year.    3.  CAD-denies angina.   cardiac catheterization on 5/23/18 which showed severe three-vessel coronary artery disease and was referred for coronary artery bypass grafting which was completed on 5/29/18 by Dr. Junior Ortega (LIMA to LAD, SVG to OM1, and SVG to diagonal 2).  He was noted to have a normal ejection fraction.    Coronary Artery Disease  Assessment  • The patient has no angina    Plan  • Lifestyle modifications discussed include adhering to a heart healthy diet, avoidance of tobacco products, maintenance of a healthy weight, medication compliance, regular exercise and regular monitoring of cholesterol and blood pressure    Subjective - Objective  • There is a history of previous coronary artery bypass graft  • Current antiplatelet therapy includes aspirin 81 mg      4.  VANDANA - compliant with CPAP    5.  Type 2 DM - on insulin and oral therapy.  Followed by [facility]    RTO 1 year with KATHIA Bonilla      Current Outpatient Medications:   •  amoxicillin (AMOXIL) 500 MG capsule, TAKE FOUR CAPSULES BY MOUTH ONE HOUR BEFORE APPOINTMENT, Disp: , " Rfl: 5  •  aspirin 81 MG chewable tablet, Chew 81 mg Daily. PT TO FOLLOW MD INSTRUCTIONS ON STOP DATE FOR SX, Disp: , Rfl:   •  cholestyramine (QUESTRAN) 4 g packet, Take 1 packet by mouth Daily., Disp: , Rfl:   •  insulin aspart (novoLOG) 100 UNIT/ML injection, Inject  under the skin 2 (Two) Times a Day. SLIDING  SCALE AFTER TESTING 2 X DAY-PT ONLY TAKES IF GLUCOSE > 150-RARELY HAS TO TAKE, Disp: , Rfl:   •  insulin NPH (humuLIN N,novoLIN N) 100 UNIT/ML injection, Inject 15 Units under the skin 2 (Two) Times a Day Before Meals., Disp: , Rfl:   •  lisinopril (PRINIVIL,ZESTRIL) 10 MG tablet, TAKE 1 TABLET BY MOUTH ONCE DAILY, Disp: 90 tablet, Rfl: 0  •  metFORMIN (GLUCOPHAGE) 500 MG tablet, Take 1 tablet by mouth 2 (Two) Times a Day With Meals., Disp: , Rfl:   •  metoprolol tartrate (LOPRESSOR) 25 MG tablet, TAKE 1 TABLET BY MOUTH TWICE DAILY, Disp: 180 tablet, Rfl: 0  •  Multiple Vitamins-Minerals (MENS MULTIVITAMIN PLUS PO), Take 1 tablet by mouth Daily. ON HOLD FOR SX, Disp: , Rfl:   •  ONE TOUCH ULTRA TEST test strip, , Disp: , Rfl:   •  ONETOUCH DELICA LANCETS 33G misc, , Disp: , Rfl:

## 2019-12-02 RX ORDER — LISINOPRIL 10 MG/1
TABLET ORAL
Qty: 90 TABLET | Refills: 2 | Status: SHIPPED | OUTPATIENT
Start: 2019-12-02 | End: 2020-08-26

## 2020-07-30 NOTE — ANESTHESIA PROCEDURE NOTES
Airway  Urgency: elective    Airway not difficult    General Information and Staff    Patient location during procedure: OR  Anesthesiologist: BONG ATKINSON    Indications and Patient Condition  Indications for airway management: airway protection    Preoxygenated: yes  MILS maintained throughout  Mask difficulty assessment: 1 - vent by mask    Final Airway Details  Final airway type: endotracheal airway      Successful airway: ETT  Cuffed: yes   Successful intubation technique: direct laryngoscopy  Facilitating devices/methods: cricoid pressure and intubating stylet  Endotracheal tube insertion site: oral  Blade: Jossy  Blade size: #3  ETT size: 8.5 mm  Cormack-Lehane Classification: grade III - view of epiglottis only  Placement verified by: chest auscultation and capnometry   Measured from: lips  ETT to lips (cm): 22  Number of attempts at approach: 3 or more    Additional Comments  Eschmann needed             Is This A New Presentation, Or A Follow-Up?: Skin Lesion What Type Of Note Output Would You Prefer (Optional)?: Standard Output How Severe Is Your Skin Lesion?: mild Has Your Skin Lesion Been Treated?: not been treated

## 2020-08-26 RX ORDER — LISINOPRIL 10 MG/1
TABLET ORAL
Qty: 90 TABLET | Refills: 0 | Status: SHIPPED | OUTPATIENT
Start: 2020-08-26 | End: 2020-10-21

## 2020-10-09 ENCOUNTER — OFFICE VISIT (OUTPATIENT)
Dept: CARDIOLOGY | Facility: CLINIC | Age: 65
End: 2020-10-09

## 2020-10-09 VITALS
HEART RATE: 69 BPM | DIASTOLIC BLOOD PRESSURE: 74 MMHG | WEIGHT: 251.2 LBS | RESPIRATION RATE: 18 BRPM | HEIGHT: 70 IN | SYSTOLIC BLOOD PRESSURE: 116 MMHG | BODY MASS INDEX: 35.96 KG/M2

## 2020-10-09 DIAGNOSIS — I10 ESSENTIAL HYPERTENSION: ICD-10-CM

## 2020-10-09 DIAGNOSIS — I25.10 CORONARY ARTERY DISEASE INVOLVING NATIVE CORONARY ARTERY OF NATIVE HEART WITHOUT ANGINA PECTORIS: Primary | ICD-10-CM

## 2020-10-09 DIAGNOSIS — I65.21 CAROTID STENOSIS, ASYMPTOMATIC, RIGHT: ICD-10-CM

## 2020-10-09 DIAGNOSIS — E11.9 TYPE 2 DIABETES MELLITUS WITHOUT COMPLICATION, WITH LONG-TERM CURRENT USE OF INSULIN (HCC): ICD-10-CM

## 2020-10-09 DIAGNOSIS — Z79.4 TYPE 2 DIABETES MELLITUS WITHOUT COMPLICATION, WITH LONG-TERM CURRENT USE OF INSULIN (HCC): ICD-10-CM

## 2020-10-09 DIAGNOSIS — E78.2 MIXED HYPERLIPIDEMIA: ICD-10-CM

## 2020-10-09 DIAGNOSIS — Z95.1 S/P CABG X 3: ICD-10-CM

## 2020-10-09 DIAGNOSIS — G47.33 OSA (OBSTRUCTIVE SLEEP APNEA): ICD-10-CM

## 2020-10-09 PROCEDURE — 93000 ELECTROCARDIOGRAM COMPLETE: CPT | Performed by: INTERNAL MEDICINE

## 2020-10-09 PROCEDURE — 99214 OFFICE O/P EST MOD 30 MIN: CPT | Performed by: INTERNAL MEDICINE

## 2020-10-09 RX ORDER — ROSUVASTATIN CALCIUM 40 MG/1
TABLET, COATED ORAL
COMMUNITY
Start: 2020-08-11

## 2020-10-09 NOTE — PROGRESS NOTES
Date of Office Visit: 10/09/2020  Encounter Provider: Suzi Ambriz MD  Place of Service: [facility] CARDIOLOGY  Patient Name: Mark Manzo  :1955      Patient ID:  Mark Manzo is a 65 y.o. male is here for  followup for CAD, s/p CABG.         History of Present Illness    He has a known history of diabetes mellitus type 2, hyperlipidemia, obesity and hypertension.  His last hemoglobin A1c was 2017, 5.7.  He is on insulin and metformin.  He has observed sleep apnea and uses a CPAP machine.  He has osteoarthritis and needs a knee replacement.     He is  and lives alone and has 2 children.  He is a .  He has not had alcohol in 24 years.  He smoking 24 years ago.  He has 3 cups of coffee per day and 2 glasses of iced tea.  In his family, his father heart disease and strokes.  His mother and father both had hypertension.He has history of alcohol dependence which is in remission     He had a stress echocardiogram on 18 which was abnormal showing mild LVH, grade 1a diastolic dysfunction, LVEF at rest of 60% and abnormal stress study showing apical hypokinesis with exercise, consistent with ischemia.  He had no cp but did have dyspnea with exertion.      He then underwent cardiac catheterization on 18 which showed severe three-vessel coronary artery disease and was referred for coronary artery bypass grafting which was completed on 18 by Dr. Junior Ortega (LIMA to LAD, SVG to OM1, and SVG to diagonal 2).  He was noted to have a normal ejection fraction.     He had carotid duplex study done  showing mild right internal carotid artery stenosis, left internal carotid artery plaque but no stenosis.    Labs done 2020 show glucose 144, creatinine 1.19, calcium 4.6, sodium 138, otherwise normal CMP, hemoglobin A1c 6.5, HDL 46, , triglycerides 139, total cholesterol 277.  He has had no chest pain or pressure.  He is not exercising  as much as he would like to.  He has no exertional dyspnea.  He has mild dizziness when he lays down and sometimes when he gets up.  Has had sinus drainage.  He has no fevers chills or cough.  He denies sore throat.  He has no orthopnea or PND.  He has no lower extremity edema.  He is taking the Crestor without difficulty.    Past Medical History:   Diagnosis Date   • Acute pain of right knee     H/O   • Arthritis    • Body aches    • Carotid artery stenosis 05/25/2018    Mid Right Internal Carotid Artery Mild Stenosis; L Internal Artery Plaque Noted Per Duplex 5/25/2018   • Chronic left hip pain    • Coronary artery disease    • Decreased energy    • Depression     Hx   • Diabetes mellitus (CMS/HCC)     2   • Fatigue    • First degree AV block 6/21/2018   • History of allergic rhinitis    • History of left hip replacement    • Hyperlipidemia    • Hypertension    • Joint pain    • Sleep apnea     Uses CPAP   • Swelling    • Weight gain          Past Surgical History:   Procedure Laterality Date   • CARDIAC CATHETERIZATION N/A 5/23/2018    Dr. Elie Sarmiento.  Left main normal, LAD long 90% proximal and mid LAD diffuse 20% disease, first diagonal 90% lesion, second diagonal 90% lesion, OM1 90% proximal lesion, 30% distal disease PDA, 100% occluded RCA   • CARDIAC CATHETERIZATION N/A 5/23/2018    Procedure: Left Heart Cath;  Surgeon: Charles Sarmiento MD;  Location: [facility] INVASIVE LOCATION;  Service: Cardiovascular   • CARDIAC CATHETERIZATION N/A 5/23/2018    Procedure: Left ventriculography;  Surgeon: Charles Sarmiento MD;  Location: [facility] INVASIVE LOCATION;  Service: Cardiovascular   • COLONOSCOPY     • CORONARY ARTERY BYPASS GRAFT N/A 5/29/2018    Procedure: SAGE STERNOTOMY CORONARY ARTERY BYPASS GRAFT TIMES 3 USING LEFT INTERNAL MAMMARY ARTERY AND LEFT GREATER SAPHENOUS VEIN GRAFT PER ENDOSCOPIC VEIN HARVESTING AND PRP;  Surgeon: Junior Ortega MD;  Location: [facility] OR;  Service: Cardiothoracic   •  OTHER SURGICAL HISTORY      Rectal Surgery Of Perirectal Fistula   • TONSILLECTOMY     • TOTAL HIP ARTHROPLASTY Left    • VASECTOMY         Current Outpatient Medications on File Prior to Visit   Medication Sig Dispense Refill   • aspirin 81 MG chewable tablet Chew 81 mg Daily. PT TO FOLLOW MD INSTRUCTIONS ON STOP DATE FOR SX     • insulin aspart (novoLOG) 100 UNIT/ML injection Inject  under the skin 2 (Two) Times a Day. SLIDING  SCALE AFTER TESTING 2 X DAY-PT ONLY TAKES IF GLUCOSE > 150-RARELY HAS TO TAKE     • insulin NPH (humuLIN N,novoLIN N) 100 UNIT/ML injection Inject 15 Units under the skin 2 (Two) Times a Day Before Meals.     • lisinopril (PRINIVIL,ZESTRIL) 10 MG tablet Take 1 tablet by mouth once daily 90 tablet 0   • metFORMIN (GLUCOPHAGE) 500 MG tablet Take 1 tablet by mouth 2 (Two) Times a Day With Meals. (Patient taking differently: Take 1,000 mg by mouth 2 (Two) Times a Day With Meals.)     • metoprolol tartrate (LOPRESSOR) 25 MG tablet TAKE 1 TABLET BY MOUTH TWICE DAILY 180 tablet 0   • Multiple Vitamins-Minerals (MENS MULTIVITAMIN PLUS PO) Take 1 tablet by mouth Daily. ON HOLD FOR SX     • ONE TOUCH ULTRA TEST test strip      • ONETOUCH DELICA LANCETS 33G misc      • rosuvastatin (CRESTOR) 40 MG tablet      • amoxicillin (AMOXIL) 500 MG capsule TAKE FOUR CAPSULES BY MOUTH ONE HOUR BEFORE APPOINTMENT  5   • [DISCONTINUED] cholestyramine (QUESTRAN) 4 g packet Take 1 packet by mouth Daily.       No current facility-administered medications on file prior to visit.        Social History     Socioeconomic History   • Marital status:      Spouse name: Not on file   • Number of children: Not on file   • Years of education: Not on file   • Highest education level: Not on file   Tobacco Use   • Smoking status: Former Smoker     Types: Cigarettes     Quit date: 1993     Years since quittin.3   • Smokeless tobacco: Never Used   • Tobacco comment: caffiene   Substance and Sexual Activity   •  "Drug use: No     Types: Marijuana     Comment: Sober x 24+ Yrs   • Sexual activity: Defer     Comment: Vasectomy           Review of Systems   Constitution: Negative.   HENT: Negative for congestion.    Eyes: Negative for vision loss in left eye and vision loss in right eye.   Respiratory: Negative.  Negative for cough, hemoptysis, shortness of breath, sleep disturbances due to breathing, snoring, sputum production and wheezing.    Endocrine: Negative.    Hematologic/Lymphatic: Negative.    Skin: Negative for poor wound healing and rash.   Musculoskeletal: Negative for falls, gout, muscle cramps and myalgias.   Gastrointestinal: Negative for abdominal pain, diarrhea, dysphagia, hematemesis, melena, nausea and vomiting.   Neurological: Negative for excessive daytime sleepiness, dizziness, headaches, light-headedness, loss of balance, seizures and vertigo.   Psychiatric/Behavioral: Negative for depression and substance abuse. The patient is not nervous/anxious.        Procedures    ECG 12 Lead    Date/Time: 10/9/2020 10:27 AM  Performed by: Suzi Ambriz MD  Authorized by: Suzi Ambriz MD   Comparison: compared with previous ECG   Similar to previous ECG  Rhythm: sinus rhythm    Clinical impression: normal ECG                Objective:      Vitals:    10/09/20 1023 10/09/20 1024 10/09/20 1025   BP: 118/72 138/88 116/74   BP Location: Left arm Left arm Left arm   Pulse: 70 73 69   Resp: 18     Weight: 114 kg (251 lb 3.2 oz)     Height: 177.8 cm (70\")       Body mass index is 36.04 kg/m².    Vitals signs reviewed.   Constitutional:       General: Not in acute distress.     Appearance: Well-developed. Not diaphoretic.   Eyes:      General: No scleral icterus.     Conjunctiva/sclera: Conjunctivae normal.   HENT:      Head: Normocephalic and atraumatic.   Neck:      Musculoskeletal: Neck supple.      Thyroid: No thyromegaly.      Vascular: No carotid bruit or JVD.      Lymphadenopathy: No cervical " adenopathy.   Pulmonary:      Effort: Pulmonary effort is normal. No respiratory distress.      Breath sounds: Normal breath sounds. No wheezing. No rhonchi. No rales.   Chest:      Chest wall: Not tender to palpatation.   Cardiovascular:      Normal rate. Regular rhythm.      No gallop.   Pulses:     Intact distal pulses.   Edema:     Peripheral edema absent.   Abdominal:      General: Bowel sounds are normal. There is no distension or abdominal bruit.      Palpations: Abdomen is soft. There is no abdominal mass.      Tenderness: There is no abdominal tenderness.   Musculoskeletal:         General: No deformity.      Extremities: No clubbing present.  Skin:     General: Skin is warm and dry. There is no cyanosis.      Coloration: Skin is not pale.      Findings: No rash.   Neurological:      Mental Status: Alert and oriented to person, place, and time.      Cranial Nerves: No cranial nerve deficit.   Psychiatric:         Judgment: Judgment normal.         Lab Review:       Assessment:      Diagnosis Plan   1. Coronary artery disease involving native coronary artery of native heart without angina pectoris     2. Mixed hyperlipidemia     3. Essential hypertension     4. VANDANA (obstructive sleep apnea)     5. Type 2 diabetes mellitus without complication, with long-term current use of insulin (CMS/[facility])     6. S/P CABG x 3     7. Carotid stenosis, asymptomatic, right  US Carotid Bilateral LAG     1. CAD, s/p CABG 5/2018, no angina or chf.   2. Normal LVEF.  3. Hypertension, take lisinopril at night due to mild dizziness.  4. Hyperlipidemia, on start rosuvastatin July 2020, await repeat labs in 3 months.  5. DM, type 2.  He sees [facility] for this.  6. Obesity.   7. Mild right carotid artery stenosis, repeat carotid duplex study.     Plan:       See Jan in 1 year.  No med changes.

## 2020-10-14 ENCOUNTER — TELEPHONE (OUTPATIENT)
Dept: CARDIOLOGY | Facility: CLINIC | Age: 65
End: 2020-10-14

## 2020-10-14 NOTE — TELEPHONE ENCOUNTER
Received labs from 7/30/2020 showing , triglycerides 139, age of 45, total cholesterol 277, glucose 144, creatinine 1.19, potassium 4.6, otherwise normal CMP, hemoglobin A1c 6.5.    Start crestor 40mg after these labs - repeat labs 3 months.

## 2020-10-21 RX ORDER — LISINOPRIL 10 MG/1
TABLET ORAL
Qty: 90 TABLET | Refills: 3 | Status: SHIPPED | OUTPATIENT
Start: 2020-10-21

## 2020-10-21 NOTE — TELEPHONE ENCOUNTER
Next appointment 10/15/2021.      Assessment:        Diagnosis Plan   1. Coronary artery disease involving native coronary artery of native heart without angina pectoris      2. Mixed hyperlipidemia      3. Essential hypertension      4. VANDANA (obstructive sleep apnea)      5. Type 2 diabetes mellitus without complication, with long-term current use of insulin (CMS/[facility])      6. S/P CABG x 3      7. Carotid stenosis, asymptomatic, right  US Carotid Bilateral LAG      1. CAD, s/p CABG 5/2018, no angina or chf.   2. Normal LVEF.  3. Hypertension, take lisinopril at night due to mild dizziness.  4. Hyperlipidemia, on start rosuvastatin July 2020, await repeat labs in 3 months.  5. DM, type 2.  He sees [facility] for this.  6. Obesity.   7. Mild right carotid artery stenosis, repeat carotid duplex study.     Plan:       See Jan in 1 year.  No med changes.

## 2020-10-26 ENCOUNTER — APPOINTMENT (OUTPATIENT)
Dept: ULTRASOUND IMAGING | Facility: HOSPITAL | Age: 65
End: 2020-10-26

## 2020-10-29 ENCOUNTER — HOSPITAL ENCOUNTER (OUTPATIENT)
Dept: ULTRASOUND IMAGING | Facility: HOSPITAL | Age: 65
Discharge: HOME | End: 2020-10-29
Admitting: INTERNAL MEDICINE

## 2020-10-29 DIAGNOSIS — I65.21 CAROTID STENOSIS, ASYMPTOMATIC, RIGHT: ICD-10-CM

## 2020-10-29 PROCEDURE — 93880 EXTRACRANIAL BILAT STUDY: CPT

## 2020-10-30 ENCOUNTER — TELEPHONE (OUTPATIENT)
Dept: CARDIOLOGY | Facility: CLINIC | Age: 65
End: 2020-10-30

## 2021-10-19 ENCOUNTER — OFFICE VISIT (OUTPATIENT)
Dept: CARDIOLOGY | Facility: CLINIC | Age: 66
End: 2021-10-19

## 2021-10-19 VITALS
HEIGHT: 70 IN | SYSTOLIC BLOOD PRESSURE: 112 MMHG | HEART RATE: 66 BPM | DIASTOLIC BLOOD PRESSURE: 70 MMHG | OXYGEN SATURATION: 96 % | WEIGHT: 260 LBS | RESPIRATION RATE: 16 BRPM | BODY MASS INDEX: 37.22 KG/M2

## 2021-10-19 DIAGNOSIS — I44.0 FIRST DEGREE AV BLOCK: ICD-10-CM

## 2021-10-19 DIAGNOSIS — Z95.1 S/P CABG X 3: ICD-10-CM

## 2021-10-19 DIAGNOSIS — I25.10 CORONARY ARTERY DISEASE INVOLVING NATIVE CORONARY ARTERY OF NATIVE HEART WITHOUT ANGINA PECTORIS: Primary | ICD-10-CM

## 2021-10-19 DIAGNOSIS — R00.2 PALPITATIONS: ICD-10-CM

## 2021-10-19 DIAGNOSIS — E78.2 MIXED HYPERLIPIDEMIA: ICD-10-CM

## 2021-10-19 DIAGNOSIS — I10 ESSENTIAL HYPERTENSION: ICD-10-CM

## 2021-10-19 PROCEDURE — 93000 ELECTROCARDIOGRAM COMPLETE: CPT | Performed by: NURSE PRACTITIONER

## 2021-10-19 PROCEDURE — 99214 OFFICE O/P EST MOD 30 MIN: CPT | Performed by: NURSE PRACTITIONER

## 2021-10-19 NOTE — PROGRESS NOTES
Date of Office Visit: 10/19/2021  Encounter Provider: KATHIA Walker  Place of Service: [facility] CARDIOLOGY  Patient Name: Mark Manzo  :1955  Primary Cardiologist: Dr. Ambriz    CC:  Annual cardiac evaluation     Dear Dr. Aviles    HPI: Mark Manzo is a pleasant 66 y.o. male who presents 10/19/2021 for cardiac follow up.  I reviewed his past medical records including notes, labs and testing in preparation for today's visit.    He has a known history of diabetes mellitus type 2, hyperlipidemia, obesity and hypertension.  His last hemoglobin A1c was 2017, 5.7.  He is on insulin and metformin.  He has observed sleep apnea and uses a CPAP machine.  He has osteoarthritis and needs a knee replacement.     He is  and lives alone and has 2 children.  He is a .  He has not had alcohol in 24 years.  He smoking 24 years ago.  He has 3 cups of coffee per day and 2 glasses of iced tea.  In his family, his father heart disease and strokes.  His mother and father both had hypertension.He has history of alcohol dependence which is in remission     He had a stress echocardiogram on 18 which was abnormal showing mild LVH, grade 1a diastolic dysfunction, LVEF at rest of 60% and abnormal stress study showing apical hypokinesis with exercise, consistent with ischemia.  He had no cp but did have dyspnea with exertion.      He then underwent cardiac catheterization on 18 which showed severe three-vessel coronary artery disease and was referred for coronary artery bypass grafting which was completed on 18 by Dr. Junior Ortega (LIMA to LAD, SVG to OM1, and SVG to diagonal 2).  He was noted to have a normal ejection fraction.     He had carotid duplex study done  showing mild right internal carotid artery stenosis, left internal carotid artery plaque but no stenosis.     He had labs 2021 that showed unremarkable CMP except for glucose of  "186.  His creatinine electrolytes and liver function testing was all within normal limits.  A lipid panel showed total cholesterol 161, HDL 55, LDL 87 and triglycerides 93.  His hemoglobin A1c was 8.2.    He states he has had a good year.  He denies any shortness of breath, lower extremity edema, dizziness or lightheadedness.  He has not had any syncopal or presyncopal episodes.  He denies any fatigue.  Occasionally he will feel a palpitation.  He states rarely he will feel a \"twinge\" in his chest.  He states it does not last very long and it can happen with or without activity but it is only seconds.  It does not cause him shortness of breath or diaphoresis.  He is taking his medications as directed.  His blood pressure is well controlled.  He is compliant with his CPAP.  Past Medical History:   Diagnosis Date   • Acute pain of right knee     H/O   • Arthritis    • Body aches    • Carotid artery stenosis 05/25/2018    Mid Right Internal Carotid Artery Mild Stenosis; L Internal Artery Plaque Noted Per Duplex 5/25/2018   • Chronic left hip pain    • Coronary artery disease    • Decreased energy    • Depression     Hx   • Diabetes mellitus (HCC)     2   • Fatigue    • First degree AV block 6/21/2018   • History of allergic rhinitis    • History of left hip replacement    • Hyperlipidemia    • Hypertension    • Joint pain    • Sleep apnea     Uses CPAP   • Swelling    • Weight gain        Past Surgical History:   Procedure Laterality Date   • CARDIAC CATHETERIZATION N/A 5/23/2018    Dr. Elie Sarmiento.  Left main normal, LAD long 90% proximal and mid LAD diffuse 20% disease, first diagonal 90% lesion, second diagonal 90% lesion, OM1 90% proximal lesion, 30% distal disease PDA, 100% occluded RCA   • CARDIAC CATHETERIZATION N/A 5/23/2018    Procedure: Left Heart Cath;  Surgeon: Charles Sarmiento MD;  Location: [facility] CATH INVASIVE LOCATION;  Service: Cardiovascular   • CARDIAC CATHETERIZATION N/A 5/23/2018    Procedure: Left " "ventriculography;  Surgeon: Charles Sarmiento MD;  Location: [facility] CATH INVASIVE LOCATION;  Service: Cardiovascular   • COLONOSCOPY     • CORONARY ARTERY BYPASS GRAFT N/A 2018    Procedure: SAGE STERNOTOMY CORONARY ARTERY BYPASS GRAFT TIMES 3 USING LEFT INTERNAL MAMMARY ARTERY AND LEFT GREATER SAPHENOUS VEIN GRAFT PER ENDOSCOPIC VEIN HARVESTING AND PRP;  Surgeon: Junior Ortega MD;  Location: [facility] OR;  Service: Cardiothoracic   • OTHER SURGICAL HISTORY      Rectal Surgery Of Perirectal Fistula   • TONSILLECTOMY     • TOTAL HIP ARTHROPLASTY Left    • VASECTOMY         Social History     Socioeconomic History   • Marital status:    Tobacco Use   • Smoking status: Former Smoker     Types: Cigarettes     Quit date: 1993     Years since quittin.4   • Smokeless tobacco: Never Used   • Tobacco comment: caffiene   Substance and Sexual Activity   • Drug use: No     Types: Marijuana     Comment: Sober x 24+ Yrs   • Sexual activity: Defer     Comment: Vasectomy       Family History   Problem Relation Age of Onset   • Hypertension Mother    • Heart attack Father    • Stroke Father    • Hypertension Father    • Diabetes Sister    • Stroke Paternal Grandfather    • Diabetes Sister    • Malig Hyperthermia Neg Hx        The following portion of the patient's history were reviewed and updated as appropriate: past medical history, past surgical history, past social history, past family history, allergies, current medications, and problem list.    Review of Systems   Constitutional: Negative for diaphoresis, fever and malaise/fatigue.   HENT: Negative for congestion, hearing loss, hoarse voice, nosebleeds and sore throat.    Eyes: Negative for photophobia, vision loss in left eye, vision loss in right eye and visual disturbance.   Cardiovascular: Positive for chest pain (\"rare twinge\") and palpitations (occ). Negative for dyspnea on exertion, irregular heartbeat, leg swelling, near-syncope, orthopnea, " paroxysmal nocturnal dyspnea and syncope.   Respiratory: Negative for cough, hemoptysis, shortness of breath, sleep disturbances due to breathing, snoring, sputum production and wheezing.    Endocrine: Negative for cold intolerance, heat intolerance, polydipsia, polyphagia and polyuria.   Hematologic/Lymphatic: Negative for bleeding problem. Does not bruise/bleed easily.   Skin: Negative for color change, dry skin, poor wound healing, rash and suspicious lesions.   Musculoskeletal: Negative for arthritis, back pain, falls, gout, joint pain, joint swelling, muscle cramps, muscle weakness and myalgias.   Gastrointestinal: Negative for bloating, abdominal pain, constipation, diarrhea, dysphagia, melena, nausea and vomiting.   Neurological: Negative for excessive daytime sleepiness, dizziness, headaches, light-headedness, loss of balance, numbness, paresthesias, seizures, vertigo and weakness.   Psychiatric/Behavioral: Negative for depression, memory loss and substance abuse. The patient is not nervous/anxious.        Allergies   Allergen Reactions   • Statins Myalgia     FATIGUE, CANNOT SLEEP  Other reaction(s): Myalgia  FATIGUE, CANNOT SLEEP         Current Outpatient Medications:   •  amoxicillin (AMOXIL) 500 MG capsule, TAKE FOUR CAPSULES BY MOUTH ONE HOUR BEFORE APPOINTMENT, Disp: , Rfl: 5  •  aspirin 81 MG chewable tablet, Chew 81 mg Daily. PT TO FOLLOW MD INSTRUCTIONS ON STOP DATE FOR SX, Disp: , Rfl:   •  insulin aspart (novoLOG) 100 UNIT/ML injection, Inject  under the skin 2 (Two) Times a Day. SLIDING  SCALE AFTER TESTING 2 X DAY-PT ONLY TAKES IF GLUCOSE > 150-RARELY HAS TO TAKE, Disp: , Rfl:   •  insulin NPH (humuLIN N,novoLIN N) 100 UNIT/ML injection, Inject 15 Units under the skin 2 (Two) Times a Day Before Meals., Disp: , Rfl:   •  lisinopril (PRINIVIL,ZESTRIL) 10 MG tablet, Take 1 tablet by mouth once daily, Disp: 90 tablet, Rfl: 3  •  metFORMIN (GLUCOPHAGE) 1000 MG tablet, TAKE 1 TABLET BY MOUTH TWICE  "DAILY . APPOINTMENT REQUIRED FOR FUTURE REFILLS, Disp: , Rfl:   •  metoprolol tartrate (LOPRESSOR) 25 MG tablet, TAKE 1 TABLET BY MOUTH TWICE DAILY, Disp: 180 tablet, Rfl: 0  •  Multiple Vitamins-Minerals (MENS MULTIVITAMIN PLUS PO), Take 1 tablet by mouth Daily. ON HOLD FOR SX, Disp: , Rfl:   •  ONE TOUCH ULTRA TEST test strip, , Disp: , Rfl:   •  ONETOUCH DELICA LANCETS 33G misc, , Disp: , Rfl:   •  rosuvastatin (CRESTOR) 40 MG tablet, , Disp: , Rfl:         Objective:     Vitals:    10/19/21 1356   BP: 112/70   Pulse: 66   Resp: 16   SpO2: 96%   Weight: 118 kg (260 lb)   Height: 177.8 cm (70\")     Body mass index is 37.31 kg/m².      Vitals reviewed.   Constitutional:       General: Not in acute distress.     Appearance: Normal and healthy appearance. Well-developed.   Eyes:      General:         Right eye: No discharge.         Left eye: No discharge.      Conjunctiva/sclera: Conjunctivae normal.   HENT:      Head: Normocephalic and atraumatic.      Right Ear: External ear normal.      Left Ear: External ear normal.      Nose: Nose normal.   Neck:      Thyroid: No thyromegaly.      Vascular: No JVD.      Trachea: No tracheal deviation.      Lymphadenopathy: No cervical adenopathy.   Pulmonary:      Effort: Pulmonary effort is normal. No respiratory distress.      Breath sounds: Normal breath sounds. No wheezing. No rales.   Chest:      Chest wall: Not tender to palpatation.   Cardiovascular:      Normal rate. Regular rhythm.      No gallop.   Pulses:     Intact distal pulses.   Edema:     Peripheral edema absent.   Abdominal:      General: There is no distension.      Palpations: Abdomen is soft.      Tenderness: There is no abdominal tenderness.   Musculoskeletal: Normal range of motion.         General: No tenderness or deformity.      Cervical back: Normal range of motion and neck supple. Skin:     General: Skin is warm and dry.      Findings: No erythema or rash.   Neurological:      Mental Status: Alert and " "oriented to person, place, and time.      Coordination: Coordination normal.   Psychiatric:         Attention and Perception: Attention normal.         Mood and Affect: Mood normal.         Speech: Speech normal.         Behavior: Behavior normal. Behavior is cooperative.         Thought Content: Thought content normal.         Cognition and Memory: Cognition normal.         Judgment: Judgment normal.               ECG 12 Lead    Date/Time: 10/19/2021 2:08 PM  Performed by: Velma Cardona APRN  Authorized by: Velma Cardona APRN   Comparison: compared with previous ECG from 10/9/2020  Similar to previous ECG  Rhythm: sinus rhythm  Rate: normal  Conduction: 1st degree AV block  ST Segments: ST segments normal  T Waves: T waves normal  QRS axis: left    Clinical impression: abnormal EKG              Assessment:       Diagnosis Plan   1. Coronary artery disease involving native coronary artery of native heart without angina pectoris  ECG 12 Lead    Adult Transthoracic Echo Complete W/ Cont if Necessary Per Protocol   2. S/P CABG x 3  ECG 12 Lead    Adult Transthoracic Echo Complete W/ Cont if Necessary Per Protocol   3. Essential hypertension  ECG 12 Lead   4. First degree AV block  ECG 12 Lead   5. Mixed hyperlipidemia  ECG 12 Lead   6. Palpitations  ECG 12 Lead    Adult Transthoracic Echo Complete W/ Cont if Necessary Per Protocol          Plan:       1.  CAD, s/p CABG 5/2018 - Rare \"twinge\".  Occasional palpitation.  Check echo, it has been 3 years since any cardiac testing.  2. Normal LVEF.  3. Hypertension - well controlled  4. Hyperlipidemia -continue on lipid-lowering therapy, he is currently on Crestor 40 mg.  Good control, labs as above from February 2021.  5. DM, type 2.  [facility]  6. Obesity - He would benefit from a weight loss program.  Encouraged walking routinely.  7. Mild right carotid artery stenosis      Check echo, and has been 3 years since any cardiac testing.  RTO " in 1 year with RM    As always, it has been a pleasure to participate in your patient's care. Thank you.       Sincerely,       KATHIA Walker      Current Outpatient Medications:   •  amoxicillin (AMOXIL) 500 MG capsule, TAKE FOUR CAPSULES BY MOUTH ONE HOUR BEFORE APPOINTMENT, Disp: , Rfl: 5  •  aspirin 81 MG chewable tablet, Chew 81 mg Daily. PT TO FOLLOW MD INSTRUCTIONS ON STOP DATE FOR SX, Disp: , Rfl:   •  insulin aspart (novoLOG) 100 UNIT/ML injection, Inject  under the skin 2 (Two) Times a Day. SLIDING  SCALE AFTER TESTING 2 X DAY-PT ONLY TAKES IF GLUCOSE > 150-RARELY HAS TO TAKE, Disp: , Rfl:   •  insulin NPH (humuLIN N,novoLIN N) 100 UNIT/ML injection, Inject 15 Units under the skin 2 (Two) Times a Day Before Meals., Disp: , Rfl:   •  lisinopril (PRINIVIL,ZESTRIL) 10 MG tablet, Take 1 tablet by mouth once daily, Disp: 90 tablet, Rfl: 3  •  metFORMIN (GLUCOPHAGE) 1000 MG tablet, TAKE 1 TABLET BY MOUTH TWICE DAILY . APPOINTMENT REQUIRED FOR FUTURE REFILLS, Disp: , Rfl:   •  metoprolol tartrate (LOPRESSOR) 25 MG tablet, TAKE 1 TABLET BY MOUTH TWICE DAILY, Disp: 180 tablet, Rfl: 0  •  Multiple Vitamins-Minerals (MENS MULTIVITAMIN PLUS PO), Take 1 tablet by mouth Daily. ON HOLD FOR SX, Disp: , Rfl:   •  ONE TOUCH ULTRA TEST test strip, , Disp: , Rfl:   •  ONETOUCH DELICA LANCETS 33G misc, , Disp: , Rfl:   •  rosuvastatin (CRESTOR) 40 MG tablet, , Disp: , Rfl:     Dictated utilizing Dragon dictation

## 2021-10-29 ENCOUNTER — HOSPITAL ENCOUNTER (OUTPATIENT)
Dept: CARDIOLOGY | Facility: HOSPITAL | Age: 66
Discharge: HOME | End: 2021-10-29

## 2021-10-29 VITALS
SYSTOLIC BLOOD PRESSURE: 135 MMHG | DIASTOLIC BLOOD PRESSURE: 76 MMHG | HEART RATE: 80 BPM | WEIGHT: 260 LBS | HEIGHT: 70 IN | BODY MASS INDEX: 37.22 KG/M2

## 2021-10-29 DIAGNOSIS — I25.10 CORONARY ARTERY DISEASE INVOLVING NATIVE CORONARY ARTERY OF NATIVE HEART WITHOUT ANGINA PECTORIS: ICD-10-CM

## 2021-10-29 DIAGNOSIS — R00.2 PALPITATIONS: ICD-10-CM

## 2021-10-29 DIAGNOSIS — Z95.1 S/P CABG X 3: ICD-10-CM

## 2021-10-29 LAB
AORTIC DIMENSIONLESS INDEX: 0.7 (DI)
BH CV ECHO MEAS - ACS: 1.7 CM
BH CV ECHO MEAS - AO MAX PG (FULL): 5.1 MMHG
BH CV ECHO MEAS - AO MAX PG: 9.4 MMHG
BH CV ECHO MEAS - AO MEAN PG (FULL): 3 MMHG
BH CV ECHO MEAS - AO MEAN PG: 5 MMHG
BH CV ECHO MEAS - AO ROOT AREA (BSA CORRECTED): 1.3
BH CV ECHO MEAS - AO ROOT AREA: 7.1 CM^2
BH CV ECHO MEAS - AO ROOT DIAM: 3 CM
BH CV ECHO MEAS - AO V2 MAX: 153 CM/SEC
BH CV ECHO MEAS - AO V2 MEAN: 106 CM/SEC
BH CV ECHO MEAS - AO V2 VTI: 30.4 CM
BH CV ECHO MEAS - ASC AORTA: 3.8 CM
BH CV ECHO MEAS - AVA(I,A): 2.9 CM^2
BH CV ECHO MEAS - AVA(I,D): 2.9 CM^2
BH CV ECHO MEAS - AVA(V,A): 3 CM^2
BH CV ECHO MEAS - AVA(V,D): 3 CM^2
BH CV ECHO MEAS - BSA(HAYCOCK): 2.5 M^2
BH CV ECHO MEAS - BSA: 2.3 M^2
BH CV ECHO MEAS - BZI_BMI: 37.3 KILOGRAMS/M^2
BH CV ECHO MEAS - BZI_METRIC_HEIGHT: 177.8 CM
BH CV ECHO MEAS - BZI_METRIC_WEIGHT: 117.9 KG
BH CV ECHO MEAS - CONTRAST EF 4CH: 66 CM2
BH CV ECHO MEAS - EDV(CUBED): 106.5 ML
BH CV ECHO MEAS - EDV(MOD-SP2): 106 ML
BH CV ECHO MEAS - EDV(MOD-SP4): 118 ML
BH CV ECHO MEAS - EDV(TEICH): 104.4 ML
BH CV ECHO MEAS - EF(CUBED): 67.8 %
BH CV ECHO MEAS - EF(MOD-BP): 66 %
BH CV ECHO MEAS - EF(MOD-SP2): 66 %
BH CV ECHO MEAS - EF(MOD-SP4): 64.2 %
BH CV ECHO MEAS - EF(TEICH): 59.3 %
BH CV ECHO MEAS - ESV(CUBED): 34.3 ML
BH CV ECHO MEAS - ESV(MOD-SP2): 36 ML
BH CV ECHO MEAS - ESV(MOD-SP4): 42.2 ML
BH CV ECHO MEAS - ESV(TEICH): 42.5 ML
BH CV ECHO MEAS - FS: 31.4 %
BH CV ECHO MEAS - IVS/LVPW: 0.77
BH CV ECHO MEAS - IVSD: 0.98 CM
BH CV ECHO MEAS - LAT PEAK E' VEL: 13.9 CM/SEC
BH CV ECHO MEAS - LV DIASTOLIC VOL/BSA (35-75): 50.6 ML/M^2
BH CV ECHO MEAS - LV MASS(C)D: 196.3 GRAMS
BH CV ECHO MEAS - LV MASS(C)DI: 84.1 GRAMS/M^2
BH CV ECHO MEAS - LV MAX PG: 4.2 MMHG
BH CV ECHO MEAS - LV MEAN PG: 2 MMHG
BH CV ECHO MEAS - LV SYSTOLIC VOL/BSA (12-30): 18.1 ML/M^2
BH CV ECHO MEAS - LV V1 MAX: 103 CM/SEC
BH CV ECHO MEAS - LV V1 MEAN: 71.1 CM/SEC
BH CV ECHO MEAS - LV V1 VTI: 19.6 CM
BH CV ECHO MEAS - LVIDD: 4.7 CM
BH CV ECHO MEAS - LVIDS: 3.3 CM
BH CV ECHO MEAS - LVLD AP2: 8.2 CM
BH CV ECHO MEAS - LVLD AP4: 8.6 CM
BH CV ECHO MEAS - LVLS AP2: 7.1 CM
BH CV ECHO MEAS - LVLS AP4: 7 CM
BH CV ECHO MEAS - LVOT AREA (M): 4.5 CM^2
BH CV ECHO MEAS - LVOT AREA: 4.5 CM^2
BH CV ECHO MEAS - LVOT DIAM: 2.4 CM
BH CV ECHO MEAS - LVPWD: 1.3 CM
BH CV ECHO MEAS - MED PEAK E' VEL: 8.1 CM/SEC
BH CV ECHO MEAS - MV A MAX VEL: 91.7 CM/SEC
BH CV ECHO MEAS - MV DEC SLOPE: 334 CM/SEC^2
BH CV ECHO MEAS - MV DEC TIME: 280 SEC
BH CV ECHO MEAS - MV E MAX VEL: 72.4 CM/SEC
BH CV ECHO MEAS - MV E/A: 0.79
BH CV ECHO MEAS - MV MEAN PG: 2 MMHG
BH CV ECHO MEAS - MV P1/2T MAX VEL: 86.1 CM/SEC
BH CV ECHO MEAS - MV P1/2T: 75.5 MSEC
BH CV ECHO MEAS - MV V2 MEAN: 63.9 CM/SEC
BH CV ECHO MEAS - MV V2 VTI: 29.5 CM
BH CV ECHO MEAS - MVA P1/2T LCG: 2.6 CM^2
BH CV ECHO MEAS - MVA(P1/2T): 2.9 CM^2
BH CV ECHO MEAS - MVA(VTI): 3 CM^2
BH CV ECHO MEAS - PA ACC TIME: 0.12 SEC
BH CV ECHO MEAS - PA MAX PG: 3.9 MMHG
BH CV ECHO MEAS - PA PR(ACCEL): 25.5 MMHG
BH CV ECHO MEAS - PA V2 MAX: 99.1 CM/SEC
BH CV ECHO MEAS - PULM A REVS DUR: 0.11 SEC
BH CV ECHO MEAS - PULM A REVS VEL: 33.2 CM/SEC
BH CV ECHO MEAS - PULM DIAS VEL: 25.3 CM/SEC
BH CV ECHO MEAS - PULM S/D: 1.8
BH CV ECHO MEAS - PULM SYS VEL: 44.3 CM/SEC
BH CV ECHO MEAS - QP/QS: 0.68
BH CV ECHO MEAS - RAP SYSTOLE: 3 MMHG
BH CV ECHO MEAS - RV MEAN PG: 1 MMHG
BH CV ECHO MEAS - RV V1 MEAN: 43 CM/SEC
BH CV ECHO MEAS - RV V1 VTI: 13.4 CM
BH CV ECHO MEAS - RVOT AREA: 4.5 CM^2
BH CV ECHO MEAS - RVOT DIAM: 2.4 CM
BH CV ECHO MEAS - SI(AO): 92.1 ML/M^2
BH CV ECHO MEAS - SI(CUBED): 30.9 ML/M^2
BH CV ECHO MEAS - SI(LVOT): 38 ML/M^2
BH CV ECHO MEAS - SI(MOD-SP2): 30 ML/M^2
BH CV ECHO MEAS - SI(MOD-SP4): 32.5 ML/M^2
BH CV ECHO MEAS - SI(TEICH): 26.5 ML/M^2
BH CV ECHO MEAS - SV(AO): 214.9 ML
BH CV ECHO MEAS - SV(CUBED): 72.2 ML
BH CV ECHO MEAS - SV(LVOT): 88.7 ML
BH CV ECHO MEAS - SV(MOD-SP2): 70 ML
BH CV ECHO MEAS - SV(MOD-SP4): 75.8 ML
BH CV ECHO MEAS - SV(RVOT): 60.6 ML
BH CV ECHO MEAS - SV(TEICH): 61.9 ML
BH CV ECHO MEASUREMENTS AVERAGE E/E' RATIO: 6.58
BH CV VAS BP RIGHT ARM: NORMAL MMHG
BH CV XLRA - RV BASE: 3.9 CM
BH CV XLRA - RV LENGTH: 9 CM
BH CV XLRA - RV MID: 1.9 CM
BH CV XLRA - TDI S': 8.8 CM/SEC
LEFT ATRIUM VOLUME INDEX: 20 ML/M2

## 2021-10-29 PROCEDURE — 93306 TTE W/DOPPLER COMPLETE: CPT

## 2021-10-29 PROCEDURE — 93306 TTE W/DOPPLER COMPLETE: CPT | Performed by: INTERNAL MEDICINE

## 2021-10-29 PROCEDURE — 25010000002 PERFLUTREN (DEFINITY) 8.476 MG IN SODIUM CHLORIDE (PF) 0.9 % 10 ML INJECTION: Performed by: INTERNAL MEDICINE

## 2021-10-29 RX ADMIN — SODIUM CHLORIDE 2 ML: 9 INJECTION INTRAMUSCULAR; INTRAVENOUS; SUBCUTANEOUS at 14:23

## 2021-11-01 ENCOUNTER — TELEPHONE (OUTPATIENT)
Dept: CARDIOLOGY | Facility: CLINIC | Age: 66
End: 2021-11-01

## 2021-11-01 NOTE — TELEPHONE ENCOUNTER
----- Message from KATHIA Dyer sent at 11/1/2021  9:27 AM EDT -----  Please call, normal echo.  Thanks

## 2024-04-29 ENCOUNTER — OFFICE VISIT (OUTPATIENT)
Dept: SLEEP MEDICINE | Facility: HOSPITAL | Age: 69
End: 2024-04-29
Payer: MEDICARE

## 2024-04-29 VITALS
HEIGHT: 70 IN | SYSTOLIC BLOOD PRESSURE: 135 MMHG | DIASTOLIC BLOOD PRESSURE: 78 MMHG | HEART RATE: 72 BPM | BODY MASS INDEX: 37.51 KG/M2 | WEIGHT: 262 LBS | OXYGEN SATURATION: 98 %

## 2024-04-29 DIAGNOSIS — Z78.9 DIFFICULTY WITH CPAP NASAL MASK USE: ICD-10-CM

## 2024-04-29 DIAGNOSIS — E66.9 OBESITY (BMI 30-39.9): ICD-10-CM

## 2024-04-29 DIAGNOSIS — G47.33 OBSTRUCTIVE SLEEP APNEA, ADULT: Primary | ICD-10-CM

## 2024-04-29 PROCEDURE — G0463 HOSPITAL OUTPT CLINIC VISIT: HCPCS

## 2024-04-29 NOTE — PROGRESS NOTES
[facility] [place] [facility]  [address]  [place] [address]  [phone]    Referring Physician: Dr. Ryan  PCP: Vince Ryan MD    Reason for consult:  Sleep disorders of VANDANA    Mark Manzo is a 68 y.o.male was [facility] today. Establishing care. Hx VANDANA and compliant with CPAP for many years. His device is only 1 year old. He sleeps from 10:30pm to 7am. Wakes up rested and refreshed. No EDS. The straps move on his head. Uses nasal mask. He replaces supplies every 6 months but changes the cushions once a month. Pressure feels OK.  Baldwin Sleepiness Score: 4. Caffeine intake 3 per day. Alcohol intake 0 per week.    Mark Manzo  has a past medical history of Acute pain of right knee, Arthritis, Body aches, Carotid artery stenosis (05/25/2018), Chronic left hip pain, Coronary artery disease, Decreased energy, Depression, Diabetes mellitus, Fatigue, First degree AV block (6/21/2018), History of allergic rhinitis, History of left hip replacement, Hyperlipidemia, Hypertension, Joint pain, Sleep apnea, Swelling, and Weight gain.     Current Medications:    Current Outpatient Medications:     aspirin 81 MG chewable tablet, Chew 81 mg Daily. PT TO FOLLOW MD INSTRUCTIONS ON STOP DATE FOR SX, Disp: , Rfl:     BD Pen Needle Aisha 2nd Gen 32G X 4 MM misc, USE 1 TO 4 TIMES DAILY AS NEEDED, Disp: , Rfl:     cetirizine (zyrTEC) 10 MG tablet, Take 1 tablet by mouth Daily., Disp: , Rfl:     Continuous Blood Gluc Sensor (FreeStyle Rubio 2 Sensor) misc, APPLY 1 EVERY 14 DAYS, Disp: , Rfl:     fluticasone (VERAMYST) 27.5 MCG/SPRAY nasal spray, 2 sprays into the nostril(s) as directed by provider Daily., Disp: , Rfl:     GUAIFENESIN PO, Take  by mouth., Disp: , Rfl:     insulin aspart (novoLOG) 100 UNIT/ML injection, Inject  under the skin into the appropriate area as directed 2 (Two) Times a Day. SLIDING  SCALE AFTER TESTING 2 X DAY-PT ONLY TAKES IF GLUCOSE > 150-RARELY HAS TO  "TAKE, Disp: , Rfl:     insulin glargine (LANTUS, SEMGLEE) 100 UNIT/ML injection, Inject 15 units nightly, Disp: , Rfl:     Insulin Lispro, 1 Unit Dial, (HUMALOG) 100 UNIT/ML solution pen-injector, Inject 2-4 units as needed with meals as directed based on blood glucose levels to a maximum of 12 units in 24 hours., Disp: , Rfl:     lisinopril (PRINIVIL,ZESTRIL) 20 MG tablet, Take 1 tablet by mouth Daily., Disp: , Rfl:     metFORMIN (GLUCOPHAGE) 1000 MG tablet, TAKE 1 TABLET BY MOUTH TWICE DAILY . APPOINTMENT REQUIRED FOR FUTURE REFILLS, Disp: , Rfl:     metoprolol tartrate (LOPRESSOR) 25 MG tablet, TAKE 1 TABLET BY MOUTH TWICE DAILY, Disp: 180 tablet, Rfl: 0    Multiple Vitamins-Minerals (MENS MULTIVITAMIN PLUS PO), Take 1 tablet by mouth Daily. ON HOLD FOR SX, Disp: , Rfl:     ONE TOUCH ULTRA TEST test strip, , Disp: , Rfl:     ONETOUCH DELICA LANCETS 33G misc, , Disp: , Rfl:     rosuvastatin (CRESTOR) 40 MG tablet, , Disp: , Rfl:    also listed in Sleep Questionnaire.    FH: family history includes Diabetes in his sister and sister; Heart attack in his father; Hypertension in his father and mother; Stroke in his father and paternal grandfather.  SH:  reports that he quit smoking about 30 years ago. His smoking use included cigarettes. He has never used smokeless tobacco. He reports that he does not currently use alcohol. He reports that he does not use drugs.    Pertinent Positive Review of Systems of swelling ankles, nasal congestion, PND, TMJ  Rest of Review of Systems was negative as recorded in Sleep Questionnaire.        Vital Signs: /78   Pulse 72   Ht 177.8 cm (70\")   Wt 119 kg (262 lb)   SpO2 98%   BMI 37.59 kg/m²     Body mass index is 37.59 kg/m².       Tongue: Large       Dentition: good       Pharynx: Posterior pharyngeal pillars are unable to see   Mallampatti: IV (only hard palate visible)        General: Alert. Cooperative. Well developed. No acute distress.             Head: " " Normocephalic. Symmetrical. Atraumatic.              Eyes: Sclera clear. No icterus. PERRLA. Normal EOM.             Ears: No deformities. Normal hearing.             Nose: No septal deviation. No drainage.          Throat: No oral lesions. No thrush. Moist mucous membranes.    Chest Wall:  Normal shape. Symmetric expansion with respiration. No tenderness.             Neck:  Trachea midline.           Lungs:  Clear to auscultation bilaterally. No wheezes. No rhonchi. No rales. Respirations regular, even and unlabored.            Heart:  Regular rhythm and normal rate. Normal S1 and S2. No murmur.     Abdomen:  Soft, non-tender and non-distended. Normal bowel sounds. No masses.  Extremities:  Moves all extremities well. No edema.           Pulses: Pulses palpable and equal bilaterally.               Skin: Dry. Intact. No bleeding. No rash.           Neuro: Moves all 4 extremities and cranial nerves grossly intact.  Psychiatric: Normal mood and affect.    Study:  7/23/2015 HST at [facility]: AHI 20.3      Report:  100% compliance average 8 hours 17 minutes AHI 0.6 set pressure 8.4 no significant air leaks.      Impression:  1. Obstructive sleep apnea, adult    2. Obesity (BMI 30-39.9)    3. Difficulty with CPAP nasal mask use          No orders of the defined types were placed in this encounter.           Plan:  Mark \"Luis\" is switching care from [facility].  He has been very compliant with his CPAP and his machine is only 1-year-old.  His mask keeps slipping on his face.  Sleep tech will see him today to fit him with a better mask.  He wants to switch DME - setup with [employer].     I reiterated the importance of effective treatment of obstructive sleep apnea with PAP machine.  Cardiovascular health risks of untreated sleep apnea were again reviewed.  Patient was asked to remain cautious if there is persistent hypersomnolence.    Change of PAP supplies regularly is important for effective use.  Change of cushion on the " mask or plugs on nasal pillows along with disposable filters once every month and change of mask frame, tubing, headgear and Velcro straps every 6 months at the minimum was reiterated.    This patient is compliant with PAP machine and benefits from its use.  Apnea hypopneas index is corrected/improved.  Daytime hypersomnolence has resolved.    Weight reduction to achieve an ideal BMI will decrease the severity of obstructive sleep apnea.  Portion limitation and regular exercise was emphasized.      Patient will follow up in this clinic 1 year.    Thank you for allowing me to participate in your patient's care.    Electronically signed by Ascencion Eason MD, 04/29/24, 1:40 PM EDT.    Part of this note may be an electronic transcription/translation of spoken language to printed text using the Dragon Dictation System.

## 2025-01-27 ENCOUNTER — DOCUMENTATION (OUTPATIENT)
Dept: SLEEP MEDICINE | Facility: HOSPITAL | Age: 70
End: 2025-01-27
Payer: MEDICARE

## 2025-04-04 ENCOUNTER — OFFICE VISIT (OUTPATIENT)
Dept: CARDIOLOGY | Facility: CLINIC | Age: 70
End: 2025-04-04
Payer: MEDICARE

## 2025-04-04 VITALS
WEIGHT: 263 LBS | DIASTOLIC BLOOD PRESSURE: 70 MMHG | HEIGHT: 70 IN | SYSTOLIC BLOOD PRESSURE: 110 MMHG | HEART RATE: 72 BPM | BODY MASS INDEX: 37.65 KG/M2

## 2025-04-04 DIAGNOSIS — Z95.1 S/P CABG X 3: ICD-10-CM

## 2025-04-04 DIAGNOSIS — I25.10 CORONARY ARTERY DISEASE INVOLVING NATIVE CORONARY ARTERY OF NATIVE HEART WITHOUT ANGINA PECTORIS: Primary | ICD-10-CM

## 2025-04-04 DIAGNOSIS — I10 ESSENTIAL HYPERTENSION: ICD-10-CM

## 2025-04-04 PROCEDURE — 93000 ELECTROCARDIOGRAM COMPLETE: CPT | Performed by: INTERNAL MEDICINE

## 2025-04-04 PROCEDURE — 1160F RVW MEDS BY RX/DR IN RCRD: CPT | Performed by: INTERNAL MEDICINE

## 2025-04-04 PROCEDURE — 99204 OFFICE O/P NEW MOD 45 MIN: CPT | Performed by: INTERNAL MEDICINE

## 2025-04-04 PROCEDURE — 3078F DIAST BP <80 MM HG: CPT | Performed by: INTERNAL MEDICINE

## 2025-04-04 PROCEDURE — 3074F SYST BP LT 130 MM HG: CPT | Performed by: INTERNAL MEDICINE

## 2025-04-04 PROCEDURE — 1159F MED LIST DOCD IN RCRD: CPT | Performed by: INTERNAL MEDICINE

## 2025-04-04 RX ORDER — EZETIMIBE 10 MG/1
10 TABLET ORAL DAILY
Qty: 90 TABLET | Refills: 3 | Status: SHIPPED | OUTPATIENT
Start: 2025-04-04

## 2025-04-04 RX ORDER — ACYCLOVIR 400 MG/1
TABLET ORAL
COMMUNITY

## 2025-04-04 NOTE — PROGRESS NOTES
Date of Office Visit: 2025  Encounter Provider: Suzi Ambriz MD  Place of Service: [facility] CARDIOLOGY  Patient Name: Mark Manzo  :1955      Patient ID:  Mark Manzo is a 69 y.o. male is here for CAD.           History of Present Illness    He has a known history of diabetes mellitus type 2, hyperlipidemia, CAD- s/p CABG, hyperlipidemia, obesity, VANDANA on CPAP, osteoarthritis and hypertension.     He is  and has 2 children.  He has not had alcohol in 24 years.  He smoking 24 years ago.  He has 3 cups of coffee per day and 2 glasses of iced tea.  In his family, his father heart disease and strokes.  His mother and father both had hypertension.He has history of alcohol dependence which is in remission     He had a stress echocardiogram on 18 which was abnormal showing mild LVH, grade 1a diastolic dysfunction, LVEF at rest of 60% and abnormal stress study showing apical hypokinesis with exercise, consistent with ischemia.       He then underwent cardiac catheterization on 18 which showed severe three-vessel coronary artery disease and was referred for coronary artery bypass grafting which was completed on 18 by Dr. Junior Ortega (LIMA to LAD, SVG to OM1, and SVG to diagonal 2).  He was noted to have a normal ejection fraction.     He had carotid duplex study done 18 showing mild right internal carotid artery stenosis, left internal carotid artery plaque but no stenosis.    Echocardiogram done 10/29/2020 shows ejection fraction of 66% with mild concentric left ventricular hypertrophy, grade 1 diastolic dysfunction, no significant valvular abnormalities.    Labs in 2025 show hemoglobin A1c 8.2%, total cholesterol 165, HDL 51, triglycerides 107, LDL 94, non-, glucose 157 with otherwise normal CMP, normal CBC.    He was last seen 10/2021.  He has done well since then.  He is not exercising.  He has no chest pain or pressure.  He  has mild exertional dyspnea.  He has had no tachycardia, dizziness or syncope.  He takes his medications as directed without difficulty.  He has no orthopnea or PND.  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    Past Medical History:   Diagnosis Date    Abnormal ECG 5/2/18    Acute pain of right knee     H/O    Arthritis     Body aches     Carotid artery stenosis 05/25/2018    Mid Right Internal Carotid Artery Mild Stenosis; L Internal Artery Plaque Noted Per Duplex 5/25/2018    Chronic left hip pain     Coronary artery disease     Decreased energy     Depression     Hx    Diabetes mellitus     2    Fatigue     First degree AV block 06/21/2018    History of allergic rhinitis     History of left hip replacement     Hyperlipidemia     Hypertension     Joint pain     Peripheral vascular disease 05/25/2018    Sleep apnea     Uses CPAP    Swelling     Weight gain          Past Surgical History:   Procedure Laterality Date    CARDIAC CATHETERIZATION N/A 05/23/2018    Dr. Elie Sarmiento.  Left main normal, LAD long 90% proximal and mid LAD diffuse 20% disease, first diagonal 90% lesion, second diagonal 90% lesion, OM1 90% proximal lesion, 30% distal disease PDA, 100% occluded RCA    CARDIAC CATHETERIZATION N/A 05/23/2018    Procedure: Left Heart Cath;  Surgeon: Charles Sarmiento MD;  Location: [facility] CATH INVASIVE LOC[facility];  Service: Cardiovascular    CARDIAC CATHETERIZATION N/A 05/23/2018    Procedure: Left ventriculography;  Surgeon: Charles Sarmiento MD;  Location: [facility] INVASIVE LOCATION;  Service: Cardiovascular    COLONOSCOPY      CORONARY ARTERY BYPASS GRAFT N/A 05/29/2018    Procedure: SAGE STERNOTOMY CORONARY ARTERY BYPASS GRAFT TIMES 3 USING LEFT INTERNAL MAMMARY ARTERY AND LEFT GREATER SAPHENOUS VEIN GRAFT PER ENDOSCOPIC VEIN HARVESTING AND PRP;  Surgeon: Junior Ortega MD;  Location: [facility] OR;  Service:  Cardiothoracic    EYE SURGERY      OTHER SURGICAL HISTORY      Rectal Surgery Of Perirectal Fistula    TONSILLECTOMY      TOTAL HIP ARTHROPLASTY Left     VASECTOMY         Current Outpatient Medications on File Prior to Visit   Medication Sig Dispense Refill    aspirin 81 MG chewable tablet Chew 1 tablet Daily. PT TO FOLLOW MD INSTRUCTIONS ON STOP DATE FOR SX      BD Pen Needle Aisha 2nd Gen 32G X 4 MM misc USE 1 TO 4 TIMES DAILY AS NEEDED      cetirizine (zyrTEC) 10 MG tablet Take 1 tablet by mouth Daily.      Continuous Glucose  (Dexcom G7 ) device Use.      Dulaglutide (Trulicity) 0.75 MG/0.5ML solution auto-injector       GUAIFENESIN PO Take  by mouth.      Insulin Lispro, 1 Unit Dial, (HUMALOG) 100 UNIT/ML solution pen-injector Inject 2-4 units as needed with meals as directed based on blood glucose levels to a maximum of 12 units in 24 hours.      ketoconazole (NIZORAL) 2 % cream APPLY CREAM TOPICALLY TO AFFECTED AREA TWICE DAILY AS NEEDED      lisinopril (PRINIVIL,ZESTRIL) 20 MG tablet Take 1 tablet by mouth Daily.      metFORMIN (GLUCOPHAGE) 1000 MG tablet TAKE 1 TABLET BY MOUTH TWICE DAILY . APPOINTMENT REQUIRED FOR FUTURE REFILLS      metoprolol tartrate (LOPRESSOR) 25 MG tablet TAKE 1 TABLET BY MOUTH TWICE DAILY 180 tablet 0    Multiple Vitamins-Minerals (MENS MULTIVITAMIN PLUS PO) Take 1 tablet by mouth Daily. ON HOLD FOR SX      ONETOUCH DELICA LANCETS 33G misc       rosuvastatin (CRESTOR) 40 MG tablet       [DISCONTINUED] Continuous Blood Gluc Sensor (FreeStyle Rubio 2 Sensor) misc APPLY 1 EVERY 14 DAYS       No current facility-administered medications on file prior to visit.       Social History     Socioeconomic History    Marital status:     Number of children: 2   Tobacco Use    Smoking status: Former     Current packs/day: 0.00     Types: Cigarettes     Quit date: 10/23/1993     Years since quittin.4     Passive exposure: Past    Smokeless tobacco: Never    Tobacco  "comments:     Smoked Marijuana daily 20 years. Last 1993          Caffeine: 3 coffee daily   Vaping Use    Vaping status: Never Used   Substance and Sexual Activity    Alcohol use: Not Currently     Comment: Alcoholic for 20 years. Sober since 10/23/93    Drug use: Yes     Types: Cocaine(coke), LSD, Marijuana, Mescaline, Psilocybin     Comment: Daily marijuana for 20 years. Others. Clean since 93    Sexual activity: Yes     Partners: Female     Birth control/protection: Surgical     Comment: Vascectomy             Procedures    ECG 12 Lead    Date/Time: 4/4/2025 12:03 PM  Performed by: Suzi Ambriz MD    Authorized by: Suzi Ambriz MD  Comparison: compared with previous ECG   Similar to previous ECG  Rhythm: sinus rhythm    Clinical impression: normal ECG              Objective:      Vitals:    04/04/25 1144   BP: 110/70   Pulse: 72   Weight: 119 kg (263 lb)   Height: 177.8 cm (70\")     Body mass index is 37.74 kg/m².    Vitals reviewed.   Constitutional:       General: Not in acute distress.     Appearance: Obese. Not diaphoretic.   Neck:      Vascular: No carotid bruit or JVD.   Pulmonary:      Effort: Pulmonary effort is normal.      Breath sounds: Normal breath sounds.   Cardiovascular:      Normal rate. Regular rhythm.      Murmurs: There is no murmur.      No gallop.  No rub.   Pulses:     Intact distal pulses.      Carotid: 2+ bilaterally.     Radial: 2+ bilaterally.     Dorsalis pedis: 2+ bilaterally.     Posterior tibial: 2+ bilaterally.  Edema:     Peripheral edema absent.   Neurological:      Cranial Nerves: No cranial nerve deficit.         Lab Review:       Assessment:      Diagnosis Plan   1. Coronary artery disease involving native coronary artery of native heart without angina pectoris        2. S/P CABG x 3        3. Essential hypertension          CAD, s/p CABG 5/2018, no angina or chf.   Hypertension, take lisinopril at night due to mild dizziness.  Hyperlipidemia, on " rosuvastatin but lipids are not to goal.  Add Zetia 10 mg daily.  DM, type 2.  He sees [facility] for this.  Hemoglobin A1c is still high, advised exercise and weight loss.  Obesity and sedentary lifestyle, advised 30 minutes of cardiovascular exercise daily..   Mild right carotid artery stenosis     Plan:       Repeat carotid duplex study after next visit, medication changes as noted above, advised 15 pounds of weight loss before the next visit.  See Arielle in 6 months.

## 2025-04-30 ENCOUNTER — OFFICE VISIT (OUTPATIENT)
Dept: SLEEP MEDICINE | Facility: HOSPITAL | Age: 70
End: 2025-04-30
Payer: MEDICARE

## 2025-04-30 VITALS
BODY MASS INDEX: 37.51 KG/M2 | HEIGHT: 70 IN | HEART RATE: 78 BPM | SYSTOLIC BLOOD PRESSURE: 130 MMHG | WEIGHT: 262 LBS | OXYGEN SATURATION: 97 % | DIASTOLIC BLOOD PRESSURE: 77 MMHG

## 2025-04-30 DIAGNOSIS — G47.33 OBSTRUCTIVE SLEEP APNEA, ADULT: Primary | ICD-10-CM

## 2025-04-30 DIAGNOSIS — E66.9 OBESITY (BMI 30-39.9): ICD-10-CM

## 2025-04-30 PROCEDURE — G0463 HOSPITAL OUTPT CLINIC VISIT: HCPCS

## 2025-05-01 ENCOUNTER — DOCUMENTATION (OUTPATIENT)
Dept: SLEEP MEDICINE | Facility: HOSPITAL | Age: 70
End: 2025-05-01
Payer: MEDICARE

## (undated) DEVICE — GLV SURG BIOGEL LTX PF 7 1/2

## (undated) DEVICE — MARKR SKIN W/RULR AND LBL

## (undated) DEVICE — SOL NACL 0.9PCT 1000ML

## (undated) DEVICE — SENSR CERBRL O2 PK/2

## (undated) DEVICE — CATH DIAG IMPULSE FL3.5 5F 100CM

## (undated) DEVICE — SPNG DISECTOR KTNER XRAY COTN 1/4X9/16IN PK/5

## (undated) DEVICE — SOL ISO/ALC RUB 70PCT 4OZ

## (undated) DEVICE — ROTATING SURGICAL PUNCHES, 1 PER POUCH: Brand: A&E MEDICAL / ROTATING SURGICAL PUNCHES

## (undated) DEVICE — 28 FR STRAIGHT – SOFT PVC CATHETER: Brand: PVC THORACIC CATHETERS

## (undated) DEVICE — CATH VENT MIV RADL PIG ST TIP 4F 110CM

## (undated) DEVICE — CATH ART FEM ST 18G 10.8CM

## (undated) DEVICE — PK ATS CUST W CARDIOTOMY RESEVOIR

## (undated) DEVICE — CANN ART EOPA 3D NV W/CONN 20F

## (undated) DEVICE — OASIS DRAIN, SINGLE, INLINE & ATS COMPATIBLE: Brand: OASIS

## (undated) DEVICE — 8 FOOT DISPOSABLE EXTENSION CABLE WITH SAFE CONNECT / ALLIGATOR CLIP

## (undated) DEVICE — KT MANIFLD CARDIAC

## (undated) DEVICE — TEMP PACING WIRE: Brand: MYO/WIRE

## (undated) DEVICE — GLIDESHEATH BASIC HYDROPHILIC COATED INTRODUCER SHEATH: Brand: GLIDESHEATH

## (undated) DEVICE — CATH DIAG IMPULSE FR4 5F 100CM

## (undated) DEVICE — CORONARY ARTERY BYPASS GRAFT MARKERS, STAINLESS STEEL, DISTAL, WITHOUT HOLDER: Brand: ANASTOMARK CORONARY ARTERY BYPASS GRAFT MARKERS, STAINLESS STEEL, DISTAL

## (undated) DEVICE — Device

## (undated) DEVICE — DRSNG SURESITE WNDW 4X4.5

## (undated) DEVICE — DRSNG BRDR MEPILEX P/OP SIL 4X12IN

## (undated) DEVICE — CANN VESL FREE FLO 2MM

## (undated) DEVICE — BIOPATCH™ ANTIMICROBIAL DRESSING WITH CHLORHEXIDINE GLUCONATE IS A HYDROPHILLIC POLYURETHANE ABSORPTIVE FOAM WITH CHLORHEXIDINE GLUCONATE (CHG) WHICH INHIBITS BACTERIAL GROWTH UNDER THE DRESSING. THE DRESSING IS INTENDED TO BE USED TO ABSORB EXUDATE, COVER A WOUND CAUSED BY VASCULAR AND NONVASCULAR PERCUTANEOUS MEDICAL DEVICES DURING SURGERY, AS WELL AS REDUCE LOCAL INFECTION AND COLONIZATION OF MICROORGANISMS.: Brand: BIOPATCH

## (undated) DEVICE — BNDG ELAS ELITE V/CLOSE 4IN 5YD LF STRL

## (undated) DEVICE — BLOWER/MISTER AXIOUS OPCAB W/TBG

## (undated) DEVICE — TBG ART PRESS 60 IN

## (undated) DEVICE — SPNG GZ WOVN 4X4IN 12PLY 10/BX STRL

## (undated) DEVICE — ST. SORBAVIEW ULTIMATE IJ SYSTEM A,C: Brand: CENTURION

## (undated) DEVICE — CANN IRR VEN BVL TP

## (undated) DEVICE — Device: Brand: MEDEX

## (undated) DEVICE — DRP SLUSH MACH FOR STND ALONE OM-ORS-321

## (undated) DEVICE — SOL IRR RNG BTL 1000ML

## (undated) DEVICE — PK CATH CARD 40

## (undated) DEVICE — AIRLIFE™ HCH HYGROSCOPIC CONDENSER HUMIDFIER: Brand: AIRLIFE™

## (undated) DEVICE — GLV SURG SENSICARE ALOE LF PF SZ7.5 GRN

## (undated) DEVICE — GLV SURG BIOGEL LTX PF 7

## (undated) DEVICE — HEMOCONCENTRATOR PERFUS LPS06

## (undated) DEVICE — DRSNG SURESITE WNDW 2.38X2.75

## (undated) DEVICE — CANN AORT ROOT DLP VNT 14G 7F

## (undated) DEVICE — PK PERFUS CUST W/CARDIOPLEGIA

## (undated) DEVICE — SOL IRR NACL 0.9PCT BT 1000ML

## (undated) DEVICE — ST TOURNI COMPL A/ 7IN

## (undated) DEVICE — DRSNG WND BORDR/ADHS NONADHR/GZ LF 4X14IN STRL

## (undated) DEVICE — TOWEL,OR,DSP,ST,BLUE,STD,4/PK,20PK/CS: Brand: MEDLINE

## (undated) DEVICE — CLAMP INSERT: Brand: STEALTH® CLAMP INSERT

## (undated) DEVICE — GLV SURG BIOGEL LTX PF 6 1/2

## (undated) DEVICE — PK HEART OPN 40

## (undated) DEVICE — BNDG ELAS ELITE V/CLOSE 6IN 5YD LF STRL

## (undated) DEVICE — INSUFFLATION TUBING,LAPAROSCOPIC: Brand: DEROYAL

## (undated) DEVICE — GW EMR FIX EXCHG J STD .035 3MM 260CM

## (undated) DEVICE — VASOVIEW VV6 PRO: Brand: VASOVIEW VV6 PRO

## (undated) DEVICE — SYS PERFUS SEP PLATLT W TIPS CUST

## (undated) DEVICE — SOL BETADINE 4OZ